# Patient Record
Sex: FEMALE | Race: WHITE | NOT HISPANIC OR LATINO | Employment: OTHER | URBAN - METROPOLITAN AREA
[De-identification: names, ages, dates, MRNs, and addresses within clinical notes are randomized per-mention and may not be internally consistent; named-entity substitution may affect disease eponyms.]

---

## 2017-06-20 ENCOUNTER — ALLSCRIPTS OFFICE VISIT (OUTPATIENT)
Dept: OTHER | Facility: OTHER | Age: 82
End: 2017-06-20

## 2017-06-20 DIAGNOSIS — G20 PARKINSON'S DISEASE (HCC): ICD-10-CM

## 2017-06-20 DIAGNOSIS — G45.0 VERTEBRO-BASILAR ARTERY SYNDROME: ICD-10-CM

## 2017-07-12 ENCOUNTER — HOSPITAL ENCOUNTER (OUTPATIENT)
Dept: MRI IMAGING | Facility: CLINIC | Age: 82
Discharge: HOME | End: 2017-07-12
Payer: MEDICARE

## 2017-07-12 DIAGNOSIS — G45.0 VERTEBRO-BASILAR ARTERY SYNDROME: ICD-10-CM

## 2017-07-12 DIAGNOSIS — G20 PARKINSON'S DISEASE (HCC): ICD-10-CM

## 2017-07-12 PROCEDURE — 70551 MRI BRAIN STEM W/O DYE: CPT

## 2017-07-12 PROCEDURE — 70547 MR ANGIOGRAPHY NECK W/O DYE: CPT

## 2017-07-12 PROCEDURE — 70544 MR ANGIOGRAPHY HEAD W/O DYE: CPT

## 2017-11-10 ENCOUNTER — ALLSCRIPTS OFFICE VISIT (OUTPATIENT)
Dept: OTHER | Facility: OTHER | Age: 82
End: 2017-11-10

## 2017-11-11 NOTE — PROGRESS NOTES
Assessment  1  Parkinson disease, symptomatic (332 0) (G20)   2  BPV (benign positional vertigo), bilateral (386 11) (H81 13)    Plan  Parkinson disease, symptomatic    · Continue with our present treatment plan ; Status:Complete;   Done: 10KWY7066   Ordered;disease, symptomatic; Ordered By:Trevon Peralta;   · Follow-up visit in 6 months Evaluation and Treatment  Follow-up  Status: Complete Done: 50MSL4669   Ordered;Parkinson disease, symptomatic; Ordered By: Bhavna March Performed:  Due: 93RCD5100; Last Updated By: Minda Kearney; 11/10/2017 1:34:58 PM    Discussion/Summary  Discussion Summary:   Patient with a history of Parkinson's disease advise to continue home exercises, continue present medications  For the benign positional vertigo she has advise she may take meclizine on a p r n  basis and does not wish to consider vestibular therapy at this time  She is advised to return back to see me in 6 months  Chief Complaint  Chief Complaint Free Text Note Form: Patient present for follow up appointment for her BPV, vertebrobasilar TIAs, and Parkinson disease  History of Present Illness  HPI: Patient is here for a follow-up visit with a history of benign positional vertigo, Parkinson's disease, and remains on Sinemet and Azilect on a regular basis with no worsening symptoms  She also denies any worsening gait difficulty  She continues to experience vertigo with changes in position and did see adequate relief with the help of meclizine  She has discontinued using meclizine and had a workup done in the form of an MRI and an MRA which were all unremarkable except for age-related changes with chronic ischemic changes on the MRI  She denies any new neurological symptoms  Symptoms  Review of Systems  Neurological ROS:  Constitutional: no fever, no chills, no recent weight gain, no recent weight loss, no complaints of feeling tired, no changes in appetite    HEENT:  no sinus problems, not feeling congested, no blurred vision, no dryness of the eyes, no eye pain, no hearing loss, no tinnitus, no mouth sores, no sore throat, no hoarseness, no dysphagia, no masses, no bleeding  Cardiovascular:  no chest pain or pressure, no palpitations present, the heart rate was not rapid or irregular, no swelling in the arms or legs, no poor circulation  Respiratory:  no unusual or persistant cough, no shortness of breath with or without exertion  Gastrointestinal:  no nausea, no vomiting, no diarrhea, no abdominal pain, no changes in bowel habits, no melena, no loss of bowel control  Genitourinary:  no incontinence, no feelings of urinary urgency, no increase in frequency, no urinary hesitancy, no dysuria, no hematuria  Musculoskeletal: immobility or loss of function-- and-- head/neck/back pain  Integumentary  no masses, no rash, no skin lesions, no livedo reticularis  Psychiatric:  no anxiety, no depression, no mood swings, no psychiatric hospitalizations, no sleep problems  Endocrine  no unusual weight loss or gain, no excessive urination, no excessive thirst, no hair loss or gain, no hot or cold intolerance, no menstrual period change or irregularity, no loss of sexual ability or drive, no erection difficulty, no nipple discharge  Hematologic/Lymphatic:  no unusual bleeding, no tendency for easy bruising, no clotting skin or lumps  Neurological General:  no headache, no nausea or vomiting, no lightheadedness, no convulsions, no blackouts, no syncope, no trauma, no photopsia, no increased sleepiness, no trouble falling asleep, no snoring, no awakening at night  Neurological Mental Status:  no confusion, no mood swings, no alteration or loss of consciousness, no difficulty expressing/understanding speech, no memory problems    Neurological Cranial Nerves:  no blurry or double vision, no loss of vision, no face drooping, no facial numbness or weakness, no taste or smell loss/changes, no hearing loss or ringing, no vertigo or dizziness, no dysphagia, no slurred speech  Neurological Motor findings include:  no tremor, no twitching, no cramping(pre/post exercise), no atrophy  Neurological Coordination:  no unsteadiness, no vertigo or dizziness, no clumsiness, no problems reaching for objects  Neurological Sensory:  no numbness, no pain, no tingling, does not fall when eyes closed or taking a shower  Neurological Gait:  no difficulty walking, not falling to one side, no sensation of being pushed, has not had falls  ROS Reviewed:   ROS reviewed  Active Problems  1  Arthritis (716 90) (M19 90)   2  BPV (benign positional vertigo), bilateral (386 11) (H81 13)   3  High cholesterol (272 0) (E78 00)   4  Hypertension (401 9) (I10)   5  Lumbar spinal stenosis (724 02) (M48 061)   6  Parkinson disease, symptomatic (332 0) (BrightTALKer)   7  Strain of thoracic region (847 1) (S29 019A)   8  Tremor (781 0) (R25 1)    Past Medical History  1  History of stomach ulcers (V12 79) (Z87 19)    Surgical History  1  History of Appendectomy   2  History of Cataract Surgery   3  History of Tonsillectomy   4  History of Total Knee Replacement Left   5  History of Total Knee Replacement Right    Family History  Mother    1  Family history of anemia (V18 2) (Z83 2)   2  Family history of chronic obstructive pulmonary disease (V17 6) (Z82 5)  Father    3  Family history of peptic ulcer (V18 59) (Z83 79)  Brother    4  Family history of cardiac pacemaker (V17 49) (Z84 89)   5  Family history of Ulcer    Social History     · Former smoker (T78 06) (Q87 236)   · Retired   · Single  Social History Reviewed: The social history was reviewed and updated today  Current Meds   1  Allopurinol 100 MG Oral Tablet; Take 1 tablet daily; Therapy: (Recorded:10Nov2017) to Recorded   2  AmLODIPine Besylate 5 MG Oral Tablet; TAKE 1 TABLET DAILY; Therapy: (Recorded:33Dou9096) to Recorded   3  Aspirin 81 MG TABS; TAKE 1 TABLET DAILY;  Therapy: (Recorded:80Uqb8697) to Recorded   4  Azilect 1 MG Oral Tablet; TAKE 1 TABLET DAILY; Therapy: 52DAR3727 to (Evaluate:05Wmu0458)  Requested for: 58Wio0587; Last Rx:91Kgw8602 Ordered   5  Calcium + D3 600-200 MG-UNIT Oral Tablet; take 2 tablet twice daily; Therapy: (Recorded:25Vcy1698) to Recorded   6  Carbidopa-Levodopa  MG Oral Tablet; TAKE 1 TABLET 3 TIMES DAILY; Therapy: 27MPC9955 to (Evaluate:15Awv0380)  Requested for: 44Mzd9912; Last Rx:81Yva9140 Ordered   7  Centrum Silver TABS; TAKE 1 TABLET DAILY; Therapy: (Recorded:92Ora0579) to Recorded   8  Fish Oil 1000 MG Oral Capsule; Take 1 capsule twice daily; Therapy: (Recorded:43Fmd4263) to Recorded   9  Meclizine HCl - 25 MG Oral Tablet; TAKE 1 TABLET Twice daily PRN; Therapy: 51SCX1815 to (Evaluate:65Mlm4310)  Requested for: 20Jun2017; Last Rx:20Jun2017 Ordered   10  Simvastatin 20 MG Oral Tablet; TAKE 1 TABLET DAILY AT BEDTIME; Therapy: (Recorded:91Cqf3313) to Recorded   11  Vitamin D CAPS; Take 1 capsule twice daily; Therapy: (Recorded:07Aav9132) to Recorded  Medication List Reviewed: The medication list was reviewed and updated today  Allergies  1  Cephalosporins   2  Penicillin V Potassium TABS    3  No Known Environmental Allergies   4  No Known Food Allergies    Vitals  Signs   Recorded: 83VZR9234 12:47PM   Heart Rate: 50  Systolic: 501  Diastolic: 74  Height: 5 ft 1 in  Weight: 166 lb   BMI Calculated: 31 37  BSA Calculated: 1 75    Physical Exam   Constitutional  General appearance: No acute distress, well appearing and well nourished  Musculoskeletal  Gait and station: Normal gait, stance and balance  -- Patient required minimal assistance to stand and her gait is normal based with no evidence of any stooping or shuffling  Muscle strength: Normal strength throughout  Muscle tone: No atrophy, abnormal movements, flaccidity, cogwheeling or spasticity  -- No significant cogwheeling rigidity was noted    Involuntary movements: Abnormal involuntary movements were observed  -- Occasional intermittent right upper extremity tremor was noted  Neurologic  Orientation to person, place, and time: Normal    Language: Names objects, able to repeat phrases and speaks spontaneously  3rd, 4th, and 6th cranial nerves: Normal    5th cranial nerve: Normal    7th cranial nerve: Normal    Sensation: Normal    Reflexes: Normal    Coordination: Normal        Signatures   Electronically signed by :  Oswaldo Garrett MD; Nov 10 2017  2:51PM EST                       (Author)

## 2018-01-11 NOTE — PROGRESS NOTES
Assessment    1  Parkinson disease, symptomatic (332 0) (G20)   2  Lumbar spinal stenosis (724 02) (M48 06)    Plan  Lumbar spinal stenosis    · Follow-up visit in 2 months Evaluation and Treatment  Follow-up  Status: Complete   Done: 03MOV5184   Ordered; For: Lumbar spinal stenosis; Ordered By: Isabel Sandhoff Performed:  Due: 49QUR3503; Last Updated By: Oxonica; 1/27/2016 4:23:24 PM  Lumbar spinal stenosis, Parkinson disease, symptomatic    · Physical Therapy Referral Other Physician Referral  Consult for gait dysfunction, patient  with a history of recently detected Parkinson's disease and lumbar spinal stenosis  Status: Hold For - Scheduling  Requested for: 88HHA2368   Ordered; For: Lumbar spinal stenosis, Parkinson disease, symptomatic; Ordered By: Isabel Sandhoff Performed:  Due: 74OVV0093  are Referring to a non- Preferred Provider : Established Patient  Care Summary provided  : Yes  Parkinson disease, symptomatic    · Azilect 1 MG Oral Tablet; TAKE 1 TABLET DAILY   Rx By: Isabel Sandhoff; Dispense: 30 Days ; #:30 Tablet; Refill: 3; For: Parkinson disease, symptomatic; ABHINAV = N; Verified Transmission to [address] [id number]; Last Updated By: SystemInstallFree; 1/27/2016 4:19:05 PM    Discussion/Summary  Discussion Summary:   Patient with a history of Parkinson's disease, gait dysfunction, lumbar spinal stenosis is now advised to continue Sinemet at the present dosage, she is advised to increase the dose of Azilect 1 mg daily, physical therapy was also prescribed and the patient is advised to return back to see me in 2 months  She denies any side effects from the medications  Medication Side Effects Reviewed: Possible side effects of new medications were reviewed with the patient/guardian today  Patient Guardian understands agrees: The treatment plan was reviewed with the patient/guardian   The patient/guardian understands and agrees with the treatment plan      Chief Complaint  Chief Complaint Free Text Note Form: Rashard Prather is a right handed 80year old lady who returns in f/u with hx of Parkinson's and lumbar spinal stenosis  History of Present Illness  HPI: Patient is here for a follow-up visit with a history of Parkinson's disease and lumbar spinal stenosis  Since her last visit she had an MRI of the brain which showed diffuse white matter changes, no evidence of any dilated ventricles  Patient also was started on Sinemet 25/100, one tablet 3 times a day  She also has been taking Azilect 0 5 mg daily and has noted significant improvement  She feels her tremor has improved, her gait is improved and she has not been able to start physical therapy yet  Patient denies any other neurological symptoms except for back pain especially if she stands for any prolonged period of time  Review of Systems  Neurological ROS:   Constitutional: no fever, no chills, no recent weight gain, no recent weight loss, no complaints of feeling tired, no changes in appetite  HEENT:  no sinus problems, not feeling congested, no blurred vision, no dryness of the eyes, no eye pain, no hearing loss, no tinnitus, no mouth sores, no sore throat, no hoarseness, no dysphagia, no masses, no bleeding  Cardiovascular:  no chest pain or pressure, no palpitations present, the heart rate was not rapid or irregular, no swelling in the arms or legs, no poor circulation  Respiratory:  no unusual or persistant cough, no shortness of breath with or without exertion  Gastrointestinal:  no nausea, no vomiting, no diarrhea, no abdominal pain, no changes in bowel habits, no melena, no loss of bowel control  Genitourinary:  no incontinence, no feelings of urinary urgency, no increase in frequency, no urinary hesitancy, no dysuria, no hematuria  Musculoskeletal: head/neck/back pain  Integumentary  no masses, no rash, no skin lesions, no livedo reticularis     Psychiatric:  no anxiety, no depression, no mood swings, no psychiatric hospitalizations, no sleep problems  Endocrine  no unusual weight loss or gain, no excessive urination, no excessive thirst, no hair loss or gain, no hot or cold intolerance, no menstrual period change or irregularity, no loss of sexual ability or drive, no erection difficulty, no nipple discharge  Hematologic/Lymphatic:  no unusual bleeding, no tendency for easy bruising, no clotting skin or lumps  Neurological General:  no headache, no nausea or vomiting, no lightheadedness, no convulsions, no blackouts, no syncope, no trauma, no photopsia, no increased sleepiness, no trouble falling asleep, no snoring, no awakening at night  Neurological Mental Status: memory problems  Neurological Cranial Nerves:  no blurry or double vision, no loss of vision, no face drooping, no facial numbness or weakness, no taste or smell loss/changes, no hearing loss or ringing, no vertigo or dizziness, no dysphagia, no slurred speech  Neurological Motor findings include: tremor and tremors have gotten better since last visit  Neurological Coordination: balance difficulties and issues walking up stairs  Neurological Sensory:  no numbness, no pain, no tingling, does not fall when eyes closed or taking a shower  Neurological Gait: difficulty walking, but has not had falls  ROS Reviewed:   ROS reviewed  Active Problems    1  Arthritis (716 90) (M19 90)   2  High cholesterol (272 0) (E78 0)   3  Hypertension (401 9) (I10)   4  Lumbar spinal stenosis (724 02) (M48 06)   5  Parkinson disease, symptomatic (332 0) (G20)   6  Tremor (781 0) (R25 1)    Past Medical History    1  History of stomach ulcers (V12 79) (Z87 19)    Surgical History    1  History of Appendectomy   2  History of Cataract Surgery   3  History of Knee Surgery    Family History    1  Family history of anemia (V18 2) (Z83 2)   2  Family history of chronic obstructive pulmonary disease (V17 6) (Z82 5)    3   Family history of peptic ulcer (V18 59) (Z53 56)    4  Family history of cardiac pacemaker (V17 49) (Z84 63)   5  Family history of Ulcer    Social History    · Retired   · Single  Social History Reviewed: The social history was reviewed and updated today  Current Meds   1  AmLODIPine Besylate 5 MG Oral Tablet; TAKE 1 TABLET DAILY; Therapy: (Recorded:31Uhy7419) to Recorded   2  Aspirin 81 MG Oral Tablet; TAKE 1 TABLET DAILY; Therapy: (Recorded:96Kvj7326) to Recorded   3  Azilect 0 5 MG Oral Tablet; TAKE 1 TABLET DAILY  Requested for: 37KFE2787; Last   Rx:54Tpz4615 Ordered   4  Calcium + D3 600-200 MG-UNIT Oral Tablet; take 2 tablet twice daily; Therapy: (Recorded:99Dsu9207) to Recorded   5  Carbidopa-Levodopa  MG Oral Tablet; TAKE 1 TABLET 3 TIMES DAILY; Therapy: 61NUO6269 to (Evaluate:78Kiy3611)  Requested for: 13Bqu3405; Last   Rx:17Wvz5004 Ordered   6  Centrum Silver TABS; TAKE 1 TABLET DAILY; Therapy: (Recorded:52Til2940) to Recorded   7  Fish Oil 1000 MG Oral Capsule; Take 1 capsule twice daily; Therapy: (Recorded:55Ttj6741) to Recorded   8  Lisinopril 20 MG Oral Tablet; TAKE 1 TABLET Bedtime; Therapy: (Recorded:23Tca9669) to Recorded   9  Lisinopril-Hydrochlorothiazide 20-25 MG Oral Tablet; TAKE 1 TABLET DAILY; Therapy: (Recorded:17Uzu9106) to Recorded   10  PriLOSEC OTC 20 MG Oral Tablet Delayed Release; TAKE 1 TABLET DAILY; Therapy: (Recorded:11Pcl1758) to Recorded   11  Simvastatin 20 MG Oral Tablet; TAKE 1 TABLET DAILY AT BEDTIME; Therapy: (Recorded:01Xik1675) to Recorded   12  Vitamin D CAPS; Take 1 capsule twice daily; Therapy: (Recorded:27Gyv5677) to Recorded  Medication List Reviewed: The medication list was reviewed and updated today  Allergies    1  Cephalosporins   2   Penicillin V Potassium TABS    Vitals  Signs [Data Includes: Current Encounter]   Recorded: 31ZLQ2029 03:45PM   Heart Rate: 58  Systolic: 476, LUE, Sitting  Diastolic: 68, LUE, Sitting  Height: 5 ft   Weight: 170 lb   BMI Calculated: 33 2  BSA Calculated: 1 74    Physical Exam   Patient has evidence of bradykinesia, resting tremor is noted intermittently in the right hand, no evidence of any cogwheeling rigidity was noted, she would turn in 3 steps and had  mild difficulty standing from the sitting position  Signatures   Electronically signed by :  Rajani Drummond MD; Jan 27 2016  4:24PM EST                       (Author)

## 2018-01-12 VITALS
HEART RATE: 50 BPM | SYSTOLIC BLOOD PRESSURE: 120 MMHG | HEIGHT: 61 IN | WEIGHT: 166 LBS | BODY MASS INDEX: 31.34 KG/M2 | DIASTOLIC BLOOD PRESSURE: 74 MMHG

## 2018-01-14 VITALS
HEART RATE: 76 BPM | BODY MASS INDEX: 28.34 KG/M2 | DIASTOLIC BLOOD PRESSURE: 54 MMHG | WEIGHT: 154 LBS | HEIGHT: 62 IN | SYSTOLIC BLOOD PRESSURE: 76 MMHG

## 2018-02-26 DIAGNOSIS — G20 PARKINSON'S DISEASE (HCC): Primary | ICD-10-CM

## 2018-02-26 RX ORDER — PRAMIPEXOLE DIHYDROCHLORIDE 0.12 MG/1
TABLET ORAL
Qty: 90 TABLET | Refills: 0 | Status: SHIPPED | OUTPATIENT
Start: 2018-02-26 | End: 2018-03-26 | Stop reason: SDUPTHER

## 2018-03-26 DIAGNOSIS — G20 PARKINSON'S DISEASE (HCC): ICD-10-CM

## 2018-03-26 RX ORDER — PRAMIPEXOLE DIHYDROCHLORIDE 0.12 MG/1
TABLET ORAL
Qty: 90 TABLET | Refills: 0 | Status: SHIPPED | OUTPATIENT
Start: 2018-03-26 | End: 2018-05-09 | Stop reason: ALTCHOICE

## 2018-04-13 DIAGNOSIS — G20 PARKINSON'S DISEASE (HCC): Primary | ICD-10-CM

## 2018-05-04 RX ORDER — LISINOPRIL 20 MG/1
20 TABLET ORAL DAILY
COMMUNITY
Start: 2018-03-19

## 2018-05-04 RX ORDER — AMLODIPINE BESYLATE 5 MG/1
1 TABLET ORAL DAILY
COMMUNITY
End: 2018-08-12 | Stop reason: ALTCHOICE

## 2018-05-04 RX ORDER — KETOCONAZOLE 20 MG/ML
SHAMPOO TOPICAL
COMMUNITY
Start: 2018-03-19 | End: 2018-08-12 | Stop reason: ALTCHOICE

## 2018-05-04 RX ORDER — ALLOPURINOL 100 MG/1
1 TABLET ORAL DAILY
COMMUNITY

## 2018-05-04 RX ORDER — RASAGILINE 1 MG/1
1 TABLET ORAL DAILY
COMMUNITY
Start: 2016-01-27 | End: 2018-08-12 | Stop reason: ALTCHOICE

## 2018-05-04 RX ORDER — CHLORAL HYDRATE 500 MG
1 CAPSULE ORAL 2 TIMES DAILY
COMMUNITY
End: 2019-03-12 | Stop reason: SDDI

## 2018-05-04 RX ORDER — SIMVASTATIN 20 MG
1 TABLET ORAL
COMMUNITY
End: 2018-08-12 | Stop reason: ALTCHOICE

## 2018-05-04 RX ORDER — MULTIVIT-MIN/IRON/FOLIC ACID/K 18-600-40
1 CAPSULE ORAL 2 TIMES DAILY
COMMUNITY

## 2018-05-04 RX ORDER — MULTIVIT WITH MINERALS/LUTEIN
1 TABLET ORAL DAILY
COMMUNITY

## 2018-05-04 RX ORDER — MECLIZINE HYDROCHLORIDE 25 MG/1
1 TABLET ORAL 2 TIMES DAILY PRN
COMMUNITY
Start: 2017-06-20 | End: 2019-03-12 | Stop reason: ALTCHOICE

## 2018-05-09 ENCOUNTER — OFFICE VISIT (OUTPATIENT)
Dept: NEUROLOGY | Facility: CLINIC | Age: 83
End: 2018-05-09
Payer: MEDICARE

## 2018-05-09 VITALS
DIASTOLIC BLOOD PRESSURE: 70 MMHG | SYSTOLIC BLOOD PRESSURE: 104 MMHG | BODY MASS INDEX: 27.05 KG/M2 | HEIGHT: 62 IN | WEIGHT: 147 LBS | HEART RATE: 60 BPM

## 2018-05-09 DIAGNOSIS — G20 PD (PARKINSON'S DISEASE) (HCC): Primary | ICD-10-CM

## 2018-05-09 PROCEDURE — 99214 OFFICE O/P EST MOD 30 MIN: CPT | Performed by: PSYCHIATRY & NEUROLOGY

## 2018-05-09 NOTE — PROGRESS NOTES
Progress Note - Neurology   Juany Antony 80 y o  female MRN: [id number]  Unit/Bed#:  Encounter: [id number]      Subjective:   Patient is here for a follow-up visit with a history of Parkinson's disease, benign positional vertigo and since her last visit overall she has been doing well, with good days and bad days and at times also experiences mid back pain especially when she does extra strenuous activities  She remains on Sinemet, Mirapex, and Azilect  She also describes swelling in both her feet  She continues to have an intermittent tremor affecting mostly the left side  Patient denies any other neurological symptoms  ROS:   Review of Systems   Constitutional: Negative  HENT: Negative  Eyes: Negative  Respiratory: Negative  Cardiovascular: Positive for leg swelling  Gastrointestinal: Negative  Endocrine: Negative  Genitourinary: Negative  Musculoskeletal: Negative  Skin: Negative  Allergic/Immunologic: Negative  Neurological: Positive for dizziness and tremors  Hematological: Negative  Psychiatric/Behavioral: Negative  Vitals: There were no vitals filed for this visit  ,Body mass index is 27 1 kg/m²      MEDS:      Current Outpatient Prescriptions:     meclizine (ANTIVERT) 25 mg tablet, Take 1 tablet by mouth 2 (two) times a day as needed, Disp: , Rfl:     rasagiline (AZILECT) 1 MG, Take 1 tablet by mouth daily, Disp: , Rfl:     allopurinol (ZYLOPRIM) 100 mg tablet, Take 1 tablet by mouth daily, Disp: , Rfl:     amLODIPine (NORVASC) 5 mg tablet, Take 1 tablet by mouth daily, Disp: , Rfl:     aspirin 81 MG tablet, Take 1 tablet by mouth daily, Disp: , Rfl:     Calcium Carb-Cholecalciferol (CALCIUM + D3) 600-200 MG-UNIT TABS, Take 2 tablets by mouth 2 (two) times a day, Disp: , Rfl:     carbidopa-levodopa (SINEMET)  mg per tablet, Take 1 tablet by mouth 3 (three) times a day, Disp: 270 tablet, Rfl: 3    Cholecalciferol (VITAMIN D) 2000 units CAPS, Take 1 capsule by mouth 2 (two) times a day, Disp: , Rfl:     ketoconazole (NIZORAL) 2 % shampoo, , Disp: , Rfl:     lisinopril (ZESTRIL) 20 mg tablet, , Disp: , Rfl:     Multiple Vitamins-Minerals (CENTRUM SILVER) tablet, Take 1 tablet by mouth daily, Disp: , Rfl:     Omega-3 Fatty Acids (FISH OIL) 1,000 mg, Take 1 capsule by mouth 2 (two) times a day, Disp: , Rfl:     pramipexole (MIRAPEX) 0 125 mg tablet, TAKE ONE TABLET BY MOUTH THREE TIMES DAILY , Disp: 90 tablet, Rfl: 0    simvastatin (ZOCOR) 20 mg tablet, Take 1 tablet by mouth, Disp: , Rfl:   :    Physical Exam:  General appearance: alert, appears stated age and cooperative  Head: Normocephalic, without obvious abnormality, atraumatic    Neurologic:  Her examination shows evidence of mild cogwheeling rigidity affecting the right upper extremity, resting tremor affecting the left upper and lower extremity, poor arm swing, she could stand without assistance, no evidence of any postural instability was noted and she has evidence of swelling in both her feet with skin color changes bilaterally  Lab Results: I have personally reviewed pertinent reports  Imaging Studies: I have personally reviewed pertinent reports  Assessment:  1  Parkinson's disease  2  Benign positional vertigo  Plan:  Patient is advised to continue Sinemet and Azilect at the present dosage, due to the severe swelling in the left lower extremity she is advised to discontinue Mirapex at this time and will try her on a dopamine agonist in the transdermal preparation form  Neupro patches were prescribed, patient was given titration samples for 1 milligram and also given 4 milligram samples  If she sees improvement and the swelling subsided she is advised to continue the same otherwise advised to call me  She will return back to see me in 2 months    Patient remains on meclizine only on a p r n  basis and has been doing well without any significant episodic vertigo  5/9/2018,3:46 PM    Dictation voice to text software has been used in the creation of this document  Please consider this in light of any contextual or grammatical errors

## 2018-06-12 ENCOUNTER — TELEPHONE (OUTPATIENT)
Dept: NEUROLOGY | Facility: CLINIC | Age: 83
End: 2018-06-12

## 2018-06-12 DIAGNOSIS — G20 PD (PARKINSON'S DISEASE) (HCC): ICD-10-CM

## 2018-06-12 NOTE — TELEPHONE ENCOUNTER
pt states that she doesn't really see any change since using neupro patch  she states that the swelling has gotten better  currently taking 4mg patch   script that was ordered on 5/9 was printed  i called pharmacy and no script on file  do you want pt to continue on neupro?  if so plesase send new script to [facility]      [phone]

## 2018-07-26 ENCOUNTER — TELEPHONE (OUTPATIENT)
Dept: NEUROLOGY | Facility: CLINIC | Age: 83
End: 2018-07-26

## 2018-07-26 ENCOUNTER — OFFICE VISIT (OUTPATIENT)
Dept: NEUROLOGY | Facility: CLINIC | Age: 83
End: 2018-07-26
Payer: MEDICARE

## 2018-07-26 VITALS
SYSTOLIC BLOOD PRESSURE: 118 MMHG | DIASTOLIC BLOOD PRESSURE: 50 MMHG | HEART RATE: 52 BPM | HEIGHT: 61 IN | WEIGHT: 150 LBS | BODY MASS INDEX: 28.32 KG/M2

## 2018-07-26 DIAGNOSIS — G20 PD (PARKINSON'S DISEASE) (HCC): Primary | ICD-10-CM

## 2018-07-26 DIAGNOSIS — S29.019D STRAIN OF THORACIC REGION, SUBSEQUENT ENCOUNTER: ICD-10-CM

## 2018-07-26 PROBLEM — S29.019A STRAIN OF THORACIC REGION: Status: ACTIVE | Noted: 2018-07-26

## 2018-07-26 PROCEDURE — 99214 OFFICE O/P EST MOD 30 MIN: CPT | Performed by: PSYCHIATRY & NEUROLOGY

## 2018-07-26 RX ORDER — LIDOCAINE 50 MG/G
1 PATCH TOPICAL DAILY
Qty: 30 PATCH | Refills: 6 | Status: SHIPPED | OUTPATIENT
Start: 2018-07-26 | End: 2018-08-12 | Stop reason: DRUGHIGH

## 2018-07-26 RX ORDER — AMANTADINE HYDROCHLORIDE 100 MG/1
100 CAPSULE, GELATIN COATED ORAL 2 TIMES DAILY
Qty: 60 CAPSULE | Refills: 3 | Status: SHIPPED | OUTPATIENT
Start: 2018-07-26 | End: 2018-11-27 | Stop reason: ALTCHOICE

## 2018-07-26 NOTE — PROGRESS NOTES
Progress Note - Neurology   Kylee Ruiz 80 y o  female MRN: [id number]  Unit/Bed#:  Encounter: [id number]      Subjective:   Patient is here for a follow-up visit with a history of Parkinson's disease, chronic mid back pain, and since her last visit she has discontinue Neupro patches as well as Mirapex her leg swelling has significantly resolved  She denies any episodes of vertigo  She continues to experience tremors on a regular basis but are not incapacitating and has been on Sinemet 25/100, 1 tablets 3 times a day and Azilect 1 mg daily  Patient also has been having worsening mid back pain in the recent past especially when her activity level is increase, and feels frustrated about not being able to be as mobile as she was  She denies any new neurological symptoms  ROS:   Review of Systems   Constitutional: Negative  HENT: Negative  Eyes: Positive for photophobia  Respiratory: Negative  Cardiovascular: Positive for leg swelling  Gastrointestinal: Negative  Endocrine: Negative  Genitourinary: Negative  Musculoskeletal: Negative  Skin: Negative  Allergic/Immunologic: Negative  Neurological: Positive for tremors and headaches  Hematological: Negative  Psychiatric/Behavioral: Negative  Vitals:   Vitals:    07/26/18 1316   BP: 118/50   Pulse: (!) 52   ,Body mass index is 28 34 kg/m²      MEDS:      Current Outpatient Prescriptions:     allopurinol (ZYLOPRIM) 100 mg tablet, Take 1 tablet by mouth daily, Disp: , Rfl:     amLODIPine (NORVASC) 5 mg tablet, Take 1 tablet by mouth daily, Disp: , Rfl:     aspirin 81 MG tablet, Take 1 tablet by mouth daily, Disp: , Rfl:     Calcium Carb-Cholecalciferol (CALCIUM + D3) 600-200 MG-UNIT TABS, Take 2 tablets by mouth 2 (two) times a day, Disp: , Rfl:     Cholecalciferol (VITAMIN D) 2000 units CAPS, Take 1 capsule by mouth 2 (two) times a day, Disp: , Rfl:     ketoconazole (NIZORAL) 2 % shampoo, , Disp: , Rfl:     lisinopril (ZESTRIL) 20 mg tablet, Take 20 mg by mouth daily  , Disp: , Rfl:     meclizine (ANTIVERT) 25 mg tablet, Take 1 tablet by mouth 2 (two) times a day as needed, Disp: , Rfl:     Multiple Vitamins-Minerals (CENTRUM SILVER) tablet, Take 1 tablet by mouth daily, Disp: , Rfl:     Omega-3 Fatty Acids (FISH OIL) 1,000 mg, Take 1 capsule by mouth 2 (two) times a day, Disp: , Rfl:     rasagiline (AZILECT) 1 MG, Take 1 tablet by mouth daily, Disp: , Rfl:     simvastatin (ZOCOR) 20 mg tablet, Take 1 tablet by mouth, Disp: , Rfl:   :    Physical Exam:  General appearance: alert, appears stated age and cooperative  Head: Normocephalic, without obvious abnormality, atraumatic    Neurologic:  On examination there is no evidence of any cranial nerve deficit, Myerson's sign is positive with mild bradykinesia of the facial muscles, she has evidence of a resting tremor affecting both lower extremities as well as the right upper extremity, no evidence of any significant cogwheeling rigidity was noted, no other focal motor or sensory deficits were noted  Deep tendon reflexes are 1+ bilaterally and there is no dysmetria noted  Patient has significant tenderness in the mid thoracic segments  Lab Results: I have personally reviewed pertinent reports  Imaging Studies: I have personally reviewed pertinent reports  Assessment:  1  Parkinson's disease, tremor predominant  2  Gait dysfunction  3  Thoracic strain  Plan:  Patient was prescribed lidocaine patches to be applied at bedtime and removed in the morning due to the severe degree of pain preventing her from sleeping at night, will advised her to continue Sinemet 25/100 1 tablet 3 times a day as well as Azilect 1 mg daily and was also prescribed amantadine 100 mg twice a day to see if she has further relief from the tremors    Side effects of the medication were again discussed, home exercise program is encouraged, patient is advised to return back to see me in 3 months     7/26/2018,1:22 PM    Dictation voice to text software has been used in the creation of this document  Please consider this in light of any contextual or grammatical errors

## 2018-07-26 NOTE — TELEPHONE ENCOUNTER
Ruth Cooper at [facility] states the licocaine patch requires a PA, however, before doing the PA she wants to see if you would like to have the patient  try the OTC aspercreme 4% patch instead, because the PA is almost always denied for lidocaine patch      [phone]

## 2018-08-12 ENCOUNTER — HOSPITAL ENCOUNTER (INPATIENT)
Facility: HOSPITAL | Age: 83
LOS: 1 days | Discharge: HOME | DRG: 305 | End: 2018-08-14
Attending: EMERGENCY MEDICINE | Admitting: INTERNAL MEDICINE
Payer: MEDICARE

## 2018-08-12 ENCOUNTER — APPOINTMENT (EMERGENCY)
Dept: CT IMAGING | Facility: HOSPITAL | Age: 83
DRG: 305 | End: 2018-08-12
Payer: MEDICARE

## 2018-08-12 ENCOUNTER — APPOINTMENT (EMERGENCY)
Dept: RADIOLOGY | Facility: HOSPITAL | Age: 83
DRG: 305 | End: 2018-08-12
Payer: MEDICARE

## 2018-08-12 DIAGNOSIS — I16.0 HYPERTENSIVE URGENCY: Primary | ICD-10-CM

## 2018-08-12 LAB
ALBUMIN SERPL BCP-MCNC: 3.9 G/DL (ref 3.5–5)
ALP SERPL-CCNC: 97 U/L (ref 46–116)
ALT SERPL W P-5'-P-CCNC: 16 U/L (ref 12–78)
ANION GAP SERPL CALCULATED.3IONS-SCNC: 10 MMOL/L (ref 4–13)
AST SERPL W P-5'-P-CCNC: 15 U/L (ref 5–45)
ATRIAL RATE: 85 BPM
BACTERIA UR QL AUTO: ABNORMAL /HPF
BASOPHILS # BLD AUTO: 0.04 THOUSANDS/ΜL (ref 0–0.1)
BASOPHILS NFR BLD AUTO: 1 % (ref 0–1)
BILIRUB SERPL-MCNC: 0.5 MG/DL (ref 0.2–1)
BILIRUB UR QL STRIP: NEGATIVE
BUN SERPL-MCNC: 24 MG/DL (ref 5–25)
CALCIUM SERPL-MCNC: 9.1 MG/DL (ref 8.3–10.1)
CHLORIDE SERPL-SCNC: 106 MMOL/L (ref 100–108)
CLARITY UR: CLEAR
CO2 SERPL-SCNC: 25 MMOL/L (ref 21–32)
COLOR UR: ABNORMAL
CREAT SERPL-MCNC: 1.13 MG/DL (ref 0.6–1.3)
EOSINOPHIL # BLD AUTO: 0.15 THOUSAND/ΜL (ref 0–0.61)
EOSINOPHIL NFR BLD AUTO: 2 % (ref 0–6)
ERYTHROCYTE [DISTWIDTH] IN BLOOD BY AUTOMATED COUNT: 13.7 % (ref 11.6–15.1)
GFR SERPL CREATININE-BSD FRML MDRD: 43 ML/MIN/1.73SQ M
GLUCOSE SERPL-MCNC: 92 MG/DL (ref 65–140)
GLUCOSE UR STRIP-MCNC: NEGATIVE MG/DL
HCT VFR BLD AUTO: 36.3 % (ref 34.8–46.1)
HGB BLD-MCNC: 12.1 G/DL (ref 11.5–15.4)
HGB UR QL STRIP.AUTO: ABNORMAL
IMM GRANULOCYTES # BLD AUTO: 0.02 THOUSAND/UL (ref 0–0.2)
IMM GRANULOCYTES NFR BLD AUTO: 0 % (ref 0–2)
KETONES UR STRIP-MCNC: NEGATIVE MG/DL
LEUKOCYTE ESTERASE UR QL STRIP: NEGATIVE
LYMPHOCYTES # BLD AUTO: 1.04 THOUSANDS/ΜL (ref 0.6–4.47)
LYMPHOCYTES NFR BLD AUTO: 15 % (ref 14–44)
MCH RBC QN AUTO: 31 PG (ref 26.8–34.3)
MCHC RBC AUTO-ENTMCNC: 33.3 G/DL (ref 31.4–37.4)
MCV RBC AUTO: 93 FL (ref 82–98)
MONOCYTES # BLD AUTO: 0.65 THOUSAND/ΜL (ref 0.17–1.22)
MONOCYTES NFR BLD AUTO: 9 % (ref 4–12)
NEUTROPHILS # BLD AUTO: 5 THOUSANDS/ΜL (ref 1.85–7.62)
NEUTS SEG NFR BLD AUTO: 73 % (ref 43–75)
NITRITE UR QL STRIP: POSITIVE
NON-SQ EPI CELLS URNS QL MICRO: ABNORMAL /HPF
NRBC BLD AUTO-RTO: 0 /100 WBCS
P AXIS: 65 DEGREES
PH UR STRIP.AUTO: 6 [PH] (ref 4.5–8)
PLATELET # BLD AUTO: 206 THOUSANDS/UL (ref 149–390)
PMV BLD AUTO: 9.7 FL (ref 8.9–12.7)
POTASSIUM SERPL-SCNC: 3.6 MMOL/L (ref 3.5–5.3)
PR INTERVAL: 202 MS
PROT SERPL-MCNC: 7 G/DL (ref 6.4–8.2)
PROT UR STRIP-MCNC: NEGATIVE MG/DL
QRS AXIS: -48 DEGREES
QRSD INTERVAL: 134 MS
QT INTERVAL: 426 MS
QTC INTERVAL: 469 MS
RBC # BLD AUTO: 3.9 MILLION/UL (ref 3.81–5.12)
RBC #/AREA URNS AUTO: ABNORMAL /HPF
SODIUM SERPL-SCNC: 141 MMOL/L (ref 136–145)
SP GR UR STRIP.AUTO: 1.01 (ref 1–1.03)
T WAVE AXIS: 15 DEGREES
TROPONIN I SERPL-MCNC: 0.02 NG/ML
TROPONIN I SERPL-MCNC: <0.02 NG/ML
UROBILINOGEN UR QL STRIP.AUTO: 0.2 E.U./DL
VENTRICULAR RATE: 73 BPM
WBC # BLD AUTO: 6.9 THOUSAND/UL (ref 4.31–10.16)
WBC #/AREA URNS AUTO: ABNORMAL /HPF

## 2018-08-12 PROCEDURE — 96374 THER/PROPH/DIAG INJ IV PUSH: CPT

## 2018-08-12 PROCEDURE — 81001 URINALYSIS AUTO W/SCOPE: CPT | Performed by: EMERGENCY MEDICINE

## 2018-08-12 PROCEDURE — 73030 X-RAY EXAM OF SHOULDER: CPT

## 2018-08-12 PROCEDURE — 36415 COLL VENOUS BLD VENIPUNCTURE: CPT

## 2018-08-12 PROCEDURE — 84484 ASSAY OF TROPONIN QUANT: CPT | Performed by: EMERGENCY MEDICINE

## 2018-08-12 PROCEDURE — 71046 X-RAY EXAM CHEST 2 VIEWS: CPT

## 2018-08-12 PROCEDURE — 99220 PR INITIAL OBSERVATION CARE/DAY 70 MINUTES: CPT | Performed by: INTERNAL MEDICINE

## 2018-08-12 PROCEDURE — 99285 EMERGENCY DEPT VISIT HI MDM: CPT

## 2018-08-12 PROCEDURE — 85025 COMPLETE CBC W/AUTO DIFF WBC: CPT | Performed by: EMERGENCY MEDICINE

## 2018-08-12 PROCEDURE — 93005 ELECTROCARDIOGRAM TRACING: CPT

## 2018-08-12 PROCEDURE — 93010 ELECTROCARDIOGRAM REPORT: CPT | Performed by: INTERNAL MEDICINE

## 2018-08-12 PROCEDURE — 70496 CT ANGIOGRAPHY HEAD: CPT

## 2018-08-12 PROCEDURE — 70498 CT ANGIOGRAPHY NECK: CPT

## 2018-08-12 PROCEDURE — 80053 COMPREHEN METABOLIC PANEL: CPT | Performed by: EMERGENCY MEDICINE

## 2018-08-12 RX ORDER — LIDOCAINE 4 G/G
PATCH TOPICAL
COMMUNITY
End: 2018-11-27 | Stop reason: ALTCHOICE

## 2018-08-12 RX ORDER — HYDRALAZINE HYDROCHLORIDE 20 MG/ML
5 INJECTION INTRAMUSCULAR; INTRAVENOUS ONCE
Status: COMPLETED | OUTPATIENT
Start: 2018-08-12 | End: 2018-08-12

## 2018-08-12 RX ORDER — CHLORAL HYDRATE 500 MG
1000 CAPSULE ORAL 2 TIMES DAILY
Status: DISCONTINUED | OUTPATIENT
Start: 2018-08-12 | End: 2018-08-14 | Stop reason: HOSPADM

## 2018-08-12 RX ORDER — LISINOPRIL 20 MG/1
20 TABLET ORAL ONCE
Status: COMPLETED | OUTPATIENT
Start: 2018-08-12 | End: 2018-08-12

## 2018-08-12 RX ORDER — LISINOPRIL 20 MG/1
20 TABLET ORAL DAILY
Status: DISCONTINUED | OUTPATIENT
Start: 2018-08-13 | End: 2018-08-14 | Stop reason: HOSPADM

## 2018-08-12 RX ORDER — LIDOCAINE 50 MG/G
1 PATCH TOPICAL DAILY
Status: DISCONTINUED | OUTPATIENT
Start: 2018-08-13 | End: 2018-08-14 | Stop reason: HOSPADM

## 2018-08-12 RX ORDER — MECLIZINE HCL 12.5 MG/1
12.5 TABLET ORAL EVERY 12 HOURS PRN
Status: DISCONTINUED | OUTPATIENT
Start: 2018-08-12 | End: 2018-08-14 | Stop reason: HOSPADM

## 2018-08-12 RX ORDER — B-COMPLEX WITH VITAMIN C
1 TABLET ORAL 2 TIMES DAILY
Status: DISCONTINUED | OUTPATIENT
Start: 2018-08-12 | End: 2018-08-14 | Stop reason: HOSPADM

## 2018-08-12 RX ORDER — AMANTADINE HYDROCHLORIDE 100 MG/1
100 CAPSULE, GELATIN COATED ORAL 2 TIMES DAILY
Status: DISCONTINUED | OUTPATIENT
Start: 2018-08-12 | End: 2018-08-14 | Stop reason: HOSPADM

## 2018-08-12 RX ORDER — ASPIRIN 81 MG/1
81 TABLET, CHEWABLE ORAL DAILY
Status: DISCONTINUED | OUTPATIENT
Start: 2018-08-13 | End: 2018-08-14 | Stop reason: HOSPADM

## 2018-08-12 RX ORDER — AMLODIPINE BESYLATE 5 MG/1
5 TABLET ORAL ONCE
Status: COMPLETED | OUTPATIENT
Start: 2018-08-12 | End: 2018-08-12

## 2018-08-12 RX ORDER — HEPARIN SODIUM 5000 [USP'U]/ML
5000 INJECTION, SOLUTION INTRAVENOUS; SUBCUTANEOUS EVERY 8 HOURS SCHEDULED
Status: DISCONTINUED | OUTPATIENT
Start: 2018-08-12 | End: 2018-08-14 | Stop reason: HOSPADM

## 2018-08-12 RX ORDER — ACETAMINOPHEN 325 MG/1
650 TABLET ORAL EVERY 6 HOURS PRN
Status: DISCONTINUED | OUTPATIENT
Start: 2018-08-12 | End: 2018-08-14 | Stop reason: HOSPADM

## 2018-08-12 RX ORDER — HYDRALAZINE HYDROCHLORIDE 20 MG/ML
10 INJECTION INTRAMUSCULAR; INTRAVENOUS EVERY 6 HOURS PRN
Status: DISCONTINUED | OUTPATIENT
Start: 2018-08-12 | End: 2018-08-14 | Stop reason: HOSPADM

## 2018-08-12 RX ORDER — ALLOPURINOL 100 MG/1
100 TABLET ORAL DAILY
Status: DISCONTINUED | OUTPATIENT
Start: 2018-08-13 | End: 2018-08-14 | Stop reason: HOSPADM

## 2018-08-12 RX ADMIN — HYDRALAZINE HYDROCHLORIDE 5 MG: 20 INJECTION INTRAMUSCULAR; INTRAVENOUS at 16:57

## 2018-08-12 RX ADMIN — IODIXANOL 100 ML: 320 INJECTION, SOLUTION INTRAVASCULAR at 13:59

## 2018-08-12 RX ADMIN — Medication 1000 MG: at 23:54

## 2018-08-12 RX ADMIN — CALCIUM CARBONATE-VITAMIN D TAB 500 MG-200 UNIT 1 TABLET: 500-200 TAB at 23:55

## 2018-08-12 RX ADMIN — LISINOPRIL 20 MG: 20 TABLET ORAL at 14:11

## 2018-08-12 RX ADMIN — HYDRALAZINE HYDROCHLORIDE 5 MG: 20 INJECTION INTRAMUSCULAR; INTRAVENOUS at 15:32

## 2018-08-12 RX ADMIN — AMANTADINE HYDROCHLORIDE 100 MG: 100 CAPSULE ORAL at 23:58

## 2018-08-12 RX ADMIN — HEPARIN SODIUM 5000 UNITS: 5000 INJECTION, SOLUTION INTRAVENOUS; SUBCUTANEOUS at 23:56

## 2018-08-12 RX ADMIN — AMLODIPINE BESYLATE 5 MG: 5 TABLET ORAL at 14:11

## 2018-08-12 NOTE — ED PROVIDER NOTES
History  Chief Complaint   Patient presents with    High Blood Pressure     Pt presents with elevated BP at home of 195/115  Pt also c/o increased blurred vision and feeling "unstable and mild vertigo "  Pt denies CP or headache  Pt states she does have a hx of Parkinson's but this "feels different "  Pt did not take BP meds at home today  HPI   75-year-old female with history of benign positional vertigo, Parkinson's disease, hypertension, who presents for evaluation of dizziness, intermittent blurry vision, and heaviness in all 4 limbs that started 4 days ago, as well as hypertension that she noticed this morning  Patient states that for the last 4 days she feels like all 4 of her limbs are becoming increasingly heavy    She denies that this is worse on 1 side as opposed to the other  States that her legs feel heavier than her arms  She has continued to be able to walk, but reports that this morning she felt unsteady  States that this feels different than her usual vertigo because the room isn't spinning around her, however Coni tavares feels unsteady    This sensation sometimes occurs at rest but mostly occurs with movement  Is not induced with turning the head from side to side  Reports checking her blood pressure this morning and finding that it was 180/120, prompting her to come into the ED for evaluation  She did not take her blood pressure medication this morning  Denies chest pain, shortness of breath, fevers, or chills  Reports pain in her right shoulder that has been present for months and makes it difficult for her to lift her right arm  Also reports chronic back pain that is not worse today than normal, and for which she has had imaging with her primary care doctor  She denies history of stroke prior stroke  Denies nausea or vomiting  Reports that her vision is not currently blurry      Of note, patient had an MRI MRA of her brain and carotids last July, which showed microvascular disease, but no acute ischemic changes  She also had an unremarkable MR angiogram of the cervical vasculature  Prior to Admission Medications   Prescriptions Last Dose Informant Patient Reported? Taking? Calcium Carb-Cholecalciferol (CALCIUM + D3) 600-200 MG-UNIT TABS   Yes No   Sig: Take 2 tablets by mouth 2 (two) times a day   Cholecalciferol (VITAMIN D) 2000 units CAPS   Yes No   Sig: Take 1 capsule by mouth 2 (two) times a day   Lidocaine 4 % PTCH   Yes Yes   Sig: Apply topically   Multiple Vitamins-Minerals (CENTRUM SILVER) tablet   Yes No   Sig: Take 1 tablet by mouth daily   Omega-3 Fatty Acids (FISH OIL) 1,000 mg   Yes No   Sig: Take 1 capsule by mouth 2 (two) times a day   allopurinol (ZYLOPRIM) 100 mg tablet   Yes No   Sig: Take 1 tablet by mouth daily   amantadine (SYMMETREL) 100 mg capsule 8/12/2018 at noon  No Yes   Sig: Take 1 capsule (100 mg total) by mouth 2 (two) times a day   aspirin 81 MG tablet 8/12/2018 at Unknown time  Yes Yes   Sig: Take 1 tablet by mouth daily   lisinopril (ZESTRIL) 20 mg tablet  Self Yes No   Sig: Take 20 mg by mouth daily     meclizine (ANTIVERT) 25 mg tablet   Yes No   Sig: Take 1 tablet by mouth 2 (two) times a day as needed      Facility-Administered Medications: None       Past Medical History:   Diagnosis Date    Arthritis     BPV (benign positional vertigo)     Gout     High cholesterol     Hypertension     Lumbar spinal stenosis     Parkinsons disease ([address] )     Strain of thoracic region     Tremor        Past Surgical History:   Procedure Laterality Date    APPENDECTOMY      CATARACT EXTRACTION      REPLACEMENT TOTAL KNEE BILATERAL      TONSILLECTOMY         Family History   Problem Relation Age of Onset    Anemia Mother     COPD Mother     Ulcers Father     Ulcers Brother     Hypotension Brother     Irregular heart beat Brother      I have reviewed and agree with the history as documented      Social History   Substance Use Topics    Smoking status: Former Smoker    Smokeless tobacco: Never Used    Alcohol use No        Review of Systems   Constitutional: Negative for chills, fatigue and fever  HENT: Negative for congestion  Eyes: Positive for visual disturbance (intermittent, not present currently)  Respiratory: Negative for cough and shortness of breath  Cardiovascular: Negative for chest pain and leg swelling  Gastrointestinal: Negative for abdominal pain, diarrhea, nausea and vomiting  Genitourinary: Negative for dysuria, flank pain and frequency  Musculoskeletal: Positive for arthralgias (R shoulder pain, chronic, unchanged), back pain (chronic, unchanged) and gait problem (shuffling, chronic)  Negative for neck pain and neck stiffness  Skin: Negative for rash  Neurological: Positive for dizziness and light-headedness  Negative for syncope, facial asymmetry, speech difficulty, weakness ("heaviness" to all 4 limbs), numbness and headaches  Psychiatric/Behavioral: Negative for agitation, behavioral problems and confusion  Physical Exam  Physical Exam   Constitutional: She is oriented to person, place, and time  She appears well-developed and well-nourished  No distress  HENT:   Head: Normocephalic and atraumatic  Right Ear: External ear normal    Left Ear: External ear normal    Nose: Nose normal    Mouth/Throat: Oropharynx is clear and moist    Eyes: Conjunctivae and EOM are normal  Pupils are equal, round, and reactive to light  Visual fields intact to confrontation  No nystagmus  Neck: Normal range of motion  Neck supple  Cardiovascular: Normal rate, regular rhythm, normal heart sounds and intact distal pulses  Exam reveals no gallop and no friction rub  No murmur heard  Pulmonary/Chest: Effort normal and breath sounds normal  No respiratory distress  She has no wheezes  She has no rales  Abdominal: Soft  Bowel sounds are normal  She exhibits no distension  There is no tenderness   There is no guarding  Musculoskeletal: Normal range of motion  She exhibits tenderness (TTP to the perispinous muscles of the thoracic and lumbar spine  )  She exhibits no edema or deformity  Neurological: She is alert and oriented to person, place, and time  She exhibits normal muscle tone  Face symmetric, tongue midline, 5/5 strength in the proximal and distal upper and lower extremities bilaterally with intact sensation to light touch throughout  CN II-XII intact  Normal finger-to-nose, rapid alternating movements, and heel-to-shin bilaterally  Normal speech  No truncal ataxia, but pt states she cannot stand without assistance because her limbs "feel heavy " No ankle clonus  Skin: Skin is warm and dry  She is not diaphoretic         Vital Signs  ED Triage Vitals [08/12/18 1231]   Temperature Pulse Respirations Blood Pressure SpO2   (!) 97 4 °F (36 3 °C) 64 18 (!) 238/102 97 %      Temp Source Heart Rate Source Patient Position - Orthostatic VS BP Location FiO2 (%)   Oral Monitor Sitting Right arm --      Pain Score       No Pain           Vitals:    08/12/18 1600 08/12/18 1630 08/12/18 1700 08/12/18 1829   BP: (!) 193/91 170/74 (!) 210/89 141/67   Pulse: 65 63  74   Patient Position - Orthostatic VS:    Lying       Visual Acuity      ED Medications  Medications   allopurinol (ZYLOPRIM) tablet 100 mg (not administered)   amantadine (SYMMETREL) capsule 100 mg (not administered)   aspirin chewable tablet 81 mg (not administered)   calcium carbonate-vitamin D (OSCAL-D) 500 mg-200 units per tablet 1 tablet (not administered)   lidocaine (LIDODERM) 5 % patch 1 patch (not administered)   lisinopril (ZESTRIL) tablet 20 mg (not administered)   meclizine (ANTIVERT) tablet 12 5 mg (not administered)   multivitamin-minerals (CENTRUM) tablet 1 tablet (not administered)   fish oil capsule 1,000 mg (not administered)   heparin (porcine) subcutaneous injection 5,000 Units (not administered)   acetaminophen (TYLENOL) tablet 650 mg (not administered)   hydrALAZINE (APRESOLINE) injection 10 mg (not administered)   amLODIPine (NORVASC) tablet 5 mg (5 mg Oral Given 8/12/18 1411)   lisinopril (ZESTRIL) tablet 20 mg (20 mg Oral Given 8/12/18 1411)   iodixanol (VISIPAQUE) 320 MG/ML injection 100 mL (100 mL Intravenous Given 8/12/18 1359)   hydrALAZINE (APRESOLINE) injection 5 mg (5 mg Intravenous Given 8/12/18 1532)   hydrALAZINE (APRESOLINE) injection 5 mg (5 mg Intravenous Given 8/12/18 1657)       Diagnostic Studies  Results Reviewed     Procedure Component Value Units Date/Time    Troponin I [[id number]]  (Normal) Collected:  08/12/18 1657    Lab Status:  Final result Specimen:  Blood from Arm, Left Updated:  08/12/18 1735     Troponin I 0 02 ng/mL     Urinalysis with microscopic [[id number]]  (Abnormal) Collected:  08/12/18 1432    Lab Status:  Final result Specimen:  Urine from Urine, Clean Catch Updated:  08/12/18 1506     Clarity, UA Clear     Color, UA Light Yellow     Specific Gravity, UA 1 015     pH, UA 6 0     Glucose, UA Negative mg/dl      Ketones, UA Negative mg/dl      Blood, UA Trace-Intact (A)     Protein, UA Negative mg/dl      Nitrite, UA Positive (A)     Bilirubin, UA Negative     Urobilinogen, UA 0 2 E U /dl      Leukocytes, UA Negative     WBC, UA 0-1 (A) /hpf      RBC, UA None Seen /hpf      Bacteria, UA Moderate (A) /hpf      Epithelial Cells Occasional /hpf     CBC and differential [[id number]] Collected:  08/12/18 1432    Lab Status:  Final result Specimen:  Blood from Arm, Right Updated:  08/12/18 1442     WBC 6 90 Thousand/uL      RBC 3 90 Million/uL      Hemoglobin 12 1 g/dL      Hematocrit 36 3 %      MCV 93 fL      MCH 31 0 pg      MCHC 33 3 g/dL      RDW 13 7 %      MPV 9 7 fL      Platelets 106 Thousands/uL      nRBC 0 /100 WBCs      Neutrophils Relative 73 %      Immat GRANS % 0 %      Lymphocytes Relative 15 %      Monocytes Relative 9 %      Eosinophils Relative 2 %      Basophils Relative 1 %      Neutrophils Absolute 5 00 Thousands/µL      Immature Grans Absolute 0 02 Thousand/uL      Lymphocytes Absolute 1 04 Thousands/µL      Monocytes Absolute 0 65 Thousand/µL      Eosinophils Absolute 0 15 Thousand/µL      Basophils Absolute 0 04 Thousands/µL     Comprehensive metabolic panel [[id number]] Collected:  08/12/18 1302    Lab Status:  Final result Specimen:  Blood from Arm, Left Updated:  08/12/18 1352     Sodium 141 mmol/L      Potassium 3 6 mmol/L      Chloride 106 mmol/L      CO2 25 mmol/L      Anion Gap 10 mmol/L      BUN 24 mg/dL      Creatinine 1 13 mg/dL      Glucose 92 mg/dL      Calcium 9 1 mg/dL      AST 15 U/L      ALT 16 U/L      Alkaline Phosphatase 97 U/L      Total Protein 7 0 g/dL      Albumin 3 9 g/dL      Total Bilirubin 0 50 mg/dL      eGFR 43 ml/min/1 73sq m     Narrative:         National Kidney Disease Education Program recommendations are as follows:  GFR calculation is accurate only with a steady state creatinine  Chronic Kidney disease less than 60 ml/min/1 73 sq  meters  Kidney failure less than 15 ml/min/1 73 sq  meters  Troponin I [[id number]]  (Normal) Collected:  08/12/18 1302    Lab Status:  Final result Specimen:  Blood from Arm, Left Updated:  08/12/18 1335     Troponin I <0 02 ng/mL                  CTA head and neck w wo contrast   Final Result by Sumanth Andino MD (08/12 9047)      No acute intracranial disease, diffuse generalized volume loss, chronic small vessel disease  No large vessel flow restrictive disease within the head or neck        * I personally telephoned this result to Hal Cabrales on 8/12/2018 4:02 PM              Workstation performed: [id number]         X-ray chest 2 views    (Results Pending)   XR shoulder 2+ views RIGHT    (Results Pending)              Procedures  Procedures       Phone Contacts  ED Phone Contact    ED Course                               MDM  Number of Diagnoses or Management Options  Hypertensive urgency:   Diagnosis management comments: Patient is nontoxic appearing  Afebrile and hemodynamically stable  She has a conglomeration of vague neurologic complaints as mentioned above, with a feeling of unsteadiness and heaviness in all 4 limbs  This is been going on for 4 days, and there is no focal deficit on exam to necessitate stroke alert  Patient does require assistance to stand, stating that she feels like her legs are heavy  No hyperreflexia, ankle clonus, or bowel or bladder incontinence to suggest spinal cord lesion or cauda equina  CTA head and neck performed with no acute evidence of CVA  X-ray performed of the right shoulder is patient has difficulty fully abducting her right arm  I do not see any acute fractures or malalignment  I personally interpreted the patient's EKG, which shows sinus rhythm with occasional PVCs  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  No over ischemic changes  No prior available for comparison  First troponin is undetectable  Second pending  CBC and CMP were unremarkable  Patient's urine has positive nitrites, leuks, and bacteria, but will not treat at this time is the patient denies dysuria, frequency, or lower abdominal pain  Blood pressure is improved to 193/91 with the patient's home blood pressure meds as well as 2 doses of IV hydralazine  Patient states the heaviness in her limbs seems to has resolved have resolved, but she continues to feel unsteady  Lack of abnormalities with cerebellar testing and lack of truncal ataxia combined with negative CTA makes cerebellar stroke less likely as the cause of her symptoms, but differential diagnosis include hypertensive urgency versus progression of her Parkinson's disease verses stroke  Plan to admit to Medicine for further workup  Patient will likely require a neurology consult in the morning          CritCare Time    Disposition  Final diagnoses:   Hypertensive urgency     Time reflects when diagnosis was documented in both MDM as applicable and the Disposition within this note     Time User Action Codes Description Comment    8/12/2018  4:32 PM Tete Rocha Add [I16 0] Hypertensive urgency       ED Disposition     ED Disposition Condition Comment    Admit  Case was discussed with YESSICA and the patient's admission status was agreed to be Admission Status: observation status to the service of Dr Jag Ramey   Follow-up Information    None         Current Discharge Medication List      CONTINUE these medications which have NOT CHANGED    Details   amantadine (SYMMETREL) 100 mg capsule Take 1 capsule (100 mg total) by mouth 2 (two) times a day  Qty: 60 capsule, Refills: 3    Associated Diagnoses: PD (Parkinson's disease) ([facility])      aspirin 81 MG tablet Take 1 tablet by mouth daily      Lidocaine 4 % PTCH Apply topically      allopurinol (ZYLOPRIM) 100 mg tablet Take 1 tablet by mouth daily      Calcium Carb-Cholecalciferol (CALCIUM + D3) 600-200 MG-UNIT TABS Take 2 tablets by mouth 2 (two) times a day      Cholecalciferol (VITAMIN D) 2000 units CAPS Take 1 capsule by mouth 2 (two) times a day      lisinopril (ZESTRIL) 20 mg tablet Take 20 mg by mouth daily        meclizine (ANTIVERT) 25 mg tablet Take 1 tablet by mouth 2 (two) times a day as needed      Multiple Vitamins-Minerals (CENTRUM SILVER) tablet Take 1 tablet by mouth daily      Omega-3 Fatty Acids (FISH OIL) 1,000 mg Take 1 capsule by mouth 2 (two) times a day           No discharge procedures on file      ED Provider  Electronically Signed by           Daisy Baeza MD  08/12/18 0041

## 2018-08-13 ENCOUNTER — APPOINTMENT (OUTPATIENT)
Dept: NON INVASIVE DIAGNOSTICS | Facility: HOSPITAL | Age: 83
DRG: 305 | End: 2018-08-13
Payer: MEDICARE

## 2018-08-13 PROBLEM — E78.00 HIGH CHOLESTEROL: Status: ACTIVE | Noted: 2018-08-13

## 2018-08-13 PROBLEM — E87.6 HYPOKALEMIA: Status: ACTIVE | Noted: 2018-08-13

## 2018-08-13 PROBLEM — M10.9 GOUT: Status: ACTIVE | Noted: 2018-08-13

## 2018-08-13 PROBLEM — H81.10 BPV (BENIGN POSITIONAL VERTIGO): Status: ACTIVE | Noted: 2018-08-13

## 2018-08-13 LAB
ANION GAP SERPL CALCULATED.3IONS-SCNC: 12 MMOL/L (ref 4–13)
BASOPHILS # BLD AUTO: 0.06 THOUSANDS/ΜL (ref 0–0.1)
BASOPHILS NFR BLD AUTO: 1 % (ref 0–1)
BUN SERPL-MCNC: 22 MG/DL (ref 5–25)
CALCIUM SERPL-MCNC: 9 MG/DL (ref 8.3–10.1)
CHLORIDE SERPL-SCNC: 105 MMOL/L (ref 100–108)
CHOLEST SERPL-MCNC: 127 MG/DL (ref 50–200)
CO2 SERPL-SCNC: 24 MMOL/L (ref 21–32)
CREAT SERPL-MCNC: 1.09 MG/DL (ref 0.6–1.3)
EOSINOPHIL # BLD AUTO: 0.12 THOUSAND/ΜL (ref 0–0.61)
EOSINOPHIL NFR BLD AUTO: 1 % (ref 0–6)
ERYTHROCYTE [DISTWIDTH] IN BLOOD BY AUTOMATED COUNT: 13.9 % (ref 11.6–15.1)
GFR SERPL CREATININE-BSD FRML MDRD: 45 ML/MIN/1.73SQ M
GLUCOSE P FAST SERPL-MCNC: 100 MG/DL (ref 65–99)
GLUCOSE SERPL-MCNC: 100 MG/DL (ref 65–140)
HCT VFR BLD AUTO: 36.3 % (ref 34.8–46.1)
HDLC SERPL-MCNC: 60 MG/DL (ref 40–60)
HGB BLD-MCNC: 12 G/DL (ref 11.5–15.4)
IMM GRANULOCYTES # BLD AUTO: 0.04 THOUSAND/UL (ref 0–0.2)
IMM GRANULOCYTES NFR BLD AUTO: 0 % (ref 0–2)
LDLC SERPL CALC-MCNC: 48 MG/DL (ref 0–100)
LYMPHOCYTES # BLD AUTO: 0.56 THOUSANDS/ΜL (ref 0.6–4.47)
LYMPHOCYTES NFR BLD AUTO: 6 % (ref 14–44)
MCH RBC QN AUTO: 31.1 PG (ref 26.8–34.3)
MCHC RBC AUTO-ENTMCNC: 33.1 G/DL (ref 31.4–37.4)
MCV RBC AUTO: 94 FL (ref 82–98)
MONOCYTES # BLD AUTO: 0.76 THOUSAND/ΜL (ref 0.17–1.22)
MONOCYTES NFR BLD AUTO: 8 % (ref 4–12)
NEUTROPHILS # BLD AUTO: 7.98 THOUSANDS/ΜL (ref 1.85–7.62)
NEUTS SEG NFR BLD AUTO: 84 % (ref 43–75)
NONHDLC SERPL-MCNC: 67 MG/DL
NRBC BLD AUTO-RTO: 0 /100 WBCS
PLATELET # BLD AUTO: 161 THOUSANDS/UL (ref 149–390)
PLATELET # BLD AUTO: 200 THOUSANDS/UL (ref 149–390)
PMV BLD AUTO: 10.1 FL (ref 8.9–12.7)
PMV BLD AUTO: 10.8 FL (ref 8.9–12.7)
POTASSIUM SERPL-SCNC: 3.1 MMOL/L (ref 3.5–5.3)
RBC # BLD AUTO: 3.86 MILLION/UL (ref 3.81–5.12)
SODIUM SERPL-SCNC: 141 MMOL/L (ref 136–145)
TRIGL SERPL-MCNC: 97 MG/DL
WBC # BLD AUTO: 9.52 THOUSAND/UL (ref 4.31–10.16)

## 2018-08-13 PROCEDURE — 99221 1ST HOSP IP/OBS SF/LOW 40: CPT | Performed by: INTERNAL MEDICINE

## 2018-08-13 PROCEDURE — 80048 BASIC METABOLIC PNL TOTAL CA: CPT | Performed by: INTERNAL MEDICINE

## 2018-08-13 PROCEDURE — 85025 COMPLETE CBC W/AUTO DIFF WBC: CPT | Performed by: INTERNAL MEDICINE

## 2018-08-13 PROCEDURE — 93306 TTE W/DOPPLER COMPLETE: CPT

## 2018-08-13 PROCEDURE — 99232 SBSQ HOSP IP/OBS MODERATE 35: CPT | Performed by: PHYSICIAN ASSISTANT

## 2018-08-13 PROCEDURE — 85049 AUTOMATED PLATELET COUNT: CPT | Performed by: INTERNAL MEDICINE

## 2018-08-13 PROCEDURE — 93306 TTE W/DOPPLER COMPLETE: CPT | Performed by: INTERNAL MEDICINE

## 2018-08-13 PROCEDURE — 80061 LIPID PANEL: CPT | Performed by: PHYSICIAN ASSISTANT

## 2018-08-13 RX ORDER — POTASSIUM CHLORIDE 20 MEQ/1
40 TABLET, EXTENDED RELEASE ORAL ONCE
Status: COMPLETED | OUTPATIENT
Start: 2018-08-13 | End: 2018-08-13

## 2018-08-13 RX ORDER — PRAMIPEXOLE DIHYDROCHLORIDE 0.25 MG/1
0.25 TABLET ORAL 3 TIMES DAILY
Status: DISCONTINUED | OUTPATIENT
Start: 2018-08-13 | End: 2018-08-14 | Stop reason: HOSPADM

## 2018-08-13 RX ORDER — PRAMIPEXOLE DIHYDROCHLORIDE 0.12 MG/1
0.25 TABLET ORAL 3 TIMES DAILY
COMMUNITY
End: 2018-10-01 | Stop reason: SDUPTHER

## 2018-08-13 RX ADMIN — AMANTADINE HYDROCHLORIDE 100 MG: 100 CAPSULE ORAL at 20:30

## 2018-08-13 RX ADMIN — HEPARIN SODIUM 5000 UNITS: 5000 INJECTION, SOLUTION INTRAVENOUS; SUBCUTANEOUS at 13:09

## 2018-08-13 RX ADMIN — ALLOPURINOL 100 MG: 100 TABLET ORAL at 09:14

## 2018-08-13 RX ADMIN — POTASSIUM CHLORIDE 40 MEQ: 1500 TABLET, EXTENDED RELEASE ORAL at 13:07

## 2018-08-13 RX ADMIN — PRAMIPEXOLE DIHYDROCHLORIDE 0.25 MG: 0.25 TABLET ORAL at 16:39

## 2018-08-13 RX ADMIN — LISINOPRIL 20 MG: 20 TABLET ORAL at 09:14

## 2018-08-13 RX ADMIN — PRAMIPEXOLE DIHYDROCHLORIDE 0.25 MG: 0.25 TABLET ORAL at 20:31

## 2018-08-13 RX ADMIN — HEPARIN SODIUM 5000 UNITS: 5000 INJECTION, SOLUTION INTRAVENOUS; SUBCUTANEOUS at 22:36

## 2018-08-13 RX ADMIN — CALCIUM CARBONATE-VITAMIN D TAB 500 MG-200 UNIT 1 TABLET: 500-200 TAB at 09:14

## 2018-08-13 RX ADMIN — Medication 1000 MG: at 09:14

## 2018-08-13 RX ADMIN — Medication 1 TABLET: at 09:14

## 2018-08-13 RX ADMIN — AMANTADINE HYDROCHLORIDE 100 MG: 100 CAPSULE ORAL at 13:07

## 2018-08-13 RX ADMIN — HEPARIN SODIUM 5000 UNITS: 5000 INJECTION, SOLUTION INTRAVENOUS; SUBCUTANEOUS at 06:14

## 2018-08-13 RX ADMIN — ASPIRIN 81 MG 81 MG: 81 TABLET ORAL at 09:14

## 2018-08-13 RX ADMIN — Medication 1000 MG: at 18:13

## 2018-08-13 RX ADMIN — ACETAMINOPHEN 650 MG: 325 TABLET, FILM COATED ORAL at 09:26

## 2018-08-13 RX ADMIN — LIDOCAINE 1 PATCH: 50 PATCH TOPICAL at 09:13

## 2018-08-13 RX ADMIN — PRAMIPEXOLE DIHYDROCHLORIDE 0.25 MG: 0.25 TABLET ORAL at 09:14

## 2018-08-13 RX ADMIN — CALCIUM CARBONATE-VITAMIN D TAB 500 MG-200 UNIT 1 TABLET: 500-200 TAB at 18:13

## 2018-08-13 NOTE — CONSULTS
Consultation - Cardiology Team One  Justin Andrews 80 y o  female MRN: [id number]  Unit/Bed#: -01 Encounter: [id number]    Inpatient consult to Cardiology  Consult performed by: Sarah Manzanares ordered by: Leonardo Ceballos          Physician Requesting Consult: Xochilt Guillen MD  Reason for Consult / Principal Problem:  Hypertensive urgency    HPI: Cardiologist Dr Alves Tequiladiana is a 80y o  year old female who has a history of hypertension, hyperlipidemia, vertigo, Parkinson's presenting with hypertensive urgency  Patient states that she has had headache for few weeks, however acutely worsened over the past 2 days accompanied by vision changes  Patient was have found to have significantly elevated BP in ER  Initial /102  Was recently discontinued off lisinopril  Since admission, patient has received multiple doses of hydralazine, p o  lisinopril, p o  Norvasc  BP has improved  Denies chest pain, SOB, lightheadedness, palpitations, leg swelling, syncope  REVIEW OF SYSTEMS:  Constitutional:  Denies fever or chills   Eyes:  +visual changes  HENT:  Denies nasal congestion or sore throat   Respiratory:  Denies cough or shortness of breath   Cardiovascular:  Denies chest pain or edema   GI:  Denies abdominal pain, nausea, vomiting, bloody stools or diarrhea   :  Denies dysuria, frequency, difficulty in micturition and nocturia  Musculoskeletal:  Denies back pain or joint pain   Neurologic:  +headache   Denies focal weakness or sensory changes   Endocrine:  Denies polyuria or polydipsia   Lymphatic:  Denies swollen glands   Psychiatric:  Denies depression or anxiety     Historical Information   Past Medical History:   Diagnosis Date    Arthritis     BPV (benign positional vertigo)     Gout     High cholesterol     Hypertension     Lumbar spinal stenosis     Parkinsons disease ([facility] [address] [facility]     Strain of thoracic region     Tremor      Past Surgical History:   Procedure Laterality Date    APPENDECTOMY      CATARACT EXTRACTION      REPLACEMENT TOTAL KNEE BILATERAL      TONSILLECTOMY       History   Alcohol Use No     History   Drug Use No     History   Smoking Status    Former Smoker   Smokeless Tobacco    Never Used       Family History:   Family History   Problem Relation Age of Onset    Anemia Mother     COPD Mother     Ulcers Father     Ulcers Brother     Hypotension Brother     Irregular heart beat Brother        MEDS & ALLERGIES:  all current active meds have been reviewed and current meds: Current Facility-Administered Medications   Medication Dose Route Frequency    acetaminophen (TYLENOL) tablet 650 mg  650 mg Oral Q6H PRN    allopurinol (ZYLOPRIM) tablet 100 mg  100 mg Oral Daily    amantadine (SYMMETREL) capsule 100 mg  100 mg Oral BID    aspirin chewable tablet 81 mg  81 mg Oral Daily    calcium carbonate-vitamin D (OSCAL-D) 500 mg-200 units per tablet 1 tablet  1 tablet Oral BID    fish oil capsule 1,000 mg  1,000 mg Oral BID    heparin (porcine) subcutaneous injection 5,000 Units  5,000 Units Subcutaneous Q8H [facility]    hydrALAZINE (APRESOLINE) injection 10 mg  10 mg Intravenous Q6H PRN    lidocaine (LIDODERM) 5 % patch 1 patch  1 patch Transdermal Daily    lisinopril (ZESTRIL) tablet 20 mg  20 mg Oral Daily    meclizine (ANTIVERT) tablet 12 5 mg  12 5 mg Oral Q12H PRN    multivitamin-minerals (CENTRUM) tablet 1 tablet  1 tablet Oral Daily    pramipexole (MIRAPEX) tablet 0 25 mg  0 25 mg Oral TID        Allergies   Allergen Reactions    Cephalosporins Rash    Penicillin V Rash       OBJECTIVE:  Vitals:   Vitals:    08/13/18 0700   BP: 145/65   Pulse: 80   Resp: 18   Temp: 97 9 °F (36 6 °C)   SpO2: 95%     Body mass index is 33 37 kg/m²      Systolic (33DNP), WKL:493 , Min:135 , FRX:143     Diastolic (91OIT), CKZ:61, Min:63, Max:163    No intake or output data in the 24 hours ending 08/13/18 1026  Weight (last 2 days)     Date/Time   Weight 08/13/18 0600  80 1 (176 59)    08/12/18 1829  82 2 (181 22)    08/12/18 1231  79 4 (175 05)            Invasive Devices     Peripheral Intravenous Line            Peripheral IV 08/12/18 Left Hand less than 1 day                PHYSICAL EXAMS:  General:  Patient is not in acute distress, laying in the bed comfortably, awake, alert responding to commands  Head: Normocephalic, Atraumatic  HEENT: White sclera, pink conjunctiva,  PERRLA,pharynx benign  Neck:  Supple, no neck vein distention, carotids+2/+2 no bruits, thyromegaly, adenopathy  Respiratory: clear to P/A  Cardiovascular:  PMI normal, S1-S2 normal, No  Murmurs, thrills, gallops, rubs   Regular rhythm  GI:  Abdomen soft nontender   No hepatosplenomegaly, adenopathy, ascites,or rebound tenderness  Extremities: No edema, normal pulses, no calf tenderness, no joint deformities, no venous disease   Integument:  No skin rashes or ulceration  Lymphatic:  No cervical or inguinal lymphadenopathy  Neurologic:  Patient is awake alert, responding to command, well-oriented to time and place and person moving all extremities    LABORATORY RESULTS:    Results from last 7 days  Lab Units 08/12/18  1657 08/12/18  1302   TROPONIN I ng/mL 0 02 <0 02     CBC with diff:   Results from last 7 days  Lab Units 08/13/18  0520 08/12/18  1432   WBC Thousand/uL 9 52 6 90   HEMOGLOBIN g/dL 12 0 12 1   HEMATOCRIT % 36 3 36 3   MCV fL 94 93   PLATELETS Thousands/uL 200 206   MCH pg 31 1 31 0   MCHC g/dL 33 1 33 3   RDW % 13 9 13 7   MPV fL 10 1 9 7   NRBC AUTO /100 WBCs 0 0       CMP:  Results from last 7 days  Lab Units 08/13/18  0520 08/12/18  1302   SODIUM mmol/L 141 141   POTASSIUM mmol/L 3 1* 3 6   CHLORIDE mmol/L 105 106   CO2 mmol/L 24 25   ANION GAP mmol/L 12 10   BUN mg/dL 22 24   CREATININE mg/dL 1 09 1 13   GLUCOSE RANDOM mg/dL 100 92   CALCIUM mg/dL 9 0 9 1   AST U/L  --  15   ALT U/L  --  16   ALK PHOS U/L  --  97   TOTAL PROTEIN g/dL  --  7 0   BILIRUBIN TOTAL mg/dL  -- 0 50   EGFR ml/min/1 73sq m 45 43       BMP:  Results from last 7 days  Lab Units 08/13/18  0520 08/12/18  1302   SODIUM mmol/L 141 141   POTASSIUM mmol/L 3 1* 3 6   CHLORIDE mmol/L 105 106   CO2 mmol/L 24 25   BUN mg/dL 22 24   CREATININE mg/dL 1 09 1 13   GLUCOSE RANDOM mg/dL 100 92   CALCIUM mg/dL 9 0 9 1                              Lipid Profile:   No results found for: CHOL  No results found for: HDL  No results found for: LDLCALC  No results found for: TRIG    Cardiac testing:   No results found for this or any previous visit  No results found for this or any previous visit  No procedure found  No results found for this or any previous visit  Imaging:   I have personally reviewed pertinent reports  EKG reviewed personally:  NSR with PACs and PVCs  Bifascicular block  Assessment/Plan:  1  Hypertensive urgency, Hx HTN:  Initial /102  Last recorded /65  Patient was restarted on lisinopril with improvement in BP  Continue present regimen, continue to monitor  Will order echocardiogram to assess EF and regional wall motion abnormalities  2   Hyperlipidemia:  On fish oil  Not on statin  Will order lipid panel  Code Status: Level 3 - DNAR and DNI    Counseling / Coordination of Care  Total floor / unit time spent today 35 minutes  Greater than 50% of total time was spent with the patient and / or family counseling and / or coordination of care  A description of the counseling / coordination of care: Review of history, current assessment, development of a plan      Matheus Donovan PA-C  8/13/2018,10:26 AM

## 2018-08-13 NOTE — ASSESSMENT & PLAN NOTE
· Patient utilizes amantadine and pramipexole per her neurology plan of care   · She sees Dr Liang Caceres  Will continue her home medication doses

## 2018-08-13 NOTE — ASSESSMENT & PLAN NOTE
Patient utilizes amantadine and pramipexole per her neurology plan of care   She sees Dr Lin Lighter    5 will continue her home medication doses

## 2018-08-13 NOTE — H&P
H&P- Griselda Bars 5/8/1930, 80 y o  female MRN: [id number]    Unit/Bed#: -01 Encounter: [id number]    Primary Care Provider: Raquel Hope DO   Date and time admitted to hospital: 8/12/2018 12:23 PM        Hypertensive urgency   Assessment & Plan    Present on admission, patient reports 2 days slight headache and feeling of confusion with some visual changes  Patient reports that she thought that it was her blood pressure  She took her pressure this morning and found to be 190  Patient reports that she recently was discontinued off of Norvasc because of dizziness  She was diagnosed with BVP  · Resume Norvasc 5 mg  · Resume lisinopril 10 mg  · Hydralazine 10 mg IV Q 6 p r n  for elevated blood pressures greater than 180  PD (Parkinson's disease) [facility])   Assessment & Plan    Patient utilizes amantadine and pramipexole per her neurology plan of care   She sees Dr iRley Figueroa  5 will continue her home medication doses        Strain of thoracic region   Assessment & Plan    Patient wears a lidocaine patch will continue  Gout   Assessment & Plan    Currently not an active flare  Continue allopurinol        High cholesterol   Assessment & Plan    Patient reports she is no longer on cholesterol medication  Will continue her fish oil only  BPV (benign positional vertigo)   Assessment & Plan    Currently stable  Will use meclizine p r n  Obesity:  Will request nutritional counseling  VTE Prophylaxis: Heparin  / sequential compression device   Code Status:  Do not resuscitate  POLST: POLST form is not discussed and not completed at this time  Discussion with family:   None present    Anticipated Length of Stay:  Patient will be admitted on an Observation basis with an anticipated length of stay of   less thanlength of stay of 2 midnights     Justification for Hospital Stay:  Titration of medication and observation for further symptoms    Total Time for Visit, including Counseling / Coordination of Care: 1 hour  Greater than 50% of this total time spent on direct patient counseling and coordination of care  Chief Complaint:     Headache and slight confusion    History of Present Illness:    Lance Colindres is a 80 y o  female who presents with 2 days of headache slight confusion and visual changes  The patient reports that she was taken off of 1 of her blood pressure medications back in June because of vertigo symptoms  She has been seeing Neurology for her parkinsonism and vertigo  She was diagnosed with benign positional vertigo at that time  She states that since that time she had on and off headaches and changes in vision but in the last 48 hours had increased significantly and she felt confused  On arrival to the emergency room her blood pressure was found to be greater than 689 systolic Anali  She was treated with oral vancomycin and lisinopril as well as 2 doses of hydralazine  Over the course of the afternoon her blood pressure has come down into the 1 90T to 807 systolicly  Patient will be admitted for further titration of medications  Patient currently states that her headache is just above the patient for nose between her eyes and has improved  She reports no nausea vomiting or chest pain    Review of Systems:    Review of Systems   Constitutional: Negative for appetite change, chills, diaphoresis, fatigue, fever and unexpected weight change  HENT: Negative for dental problem, ear discharge, ear pain, facial swelling, hearing loss, mouth sores, nosebleeds and rhinorrhea  Eyes: Negative for pain, discharge, redness and visual disturbance  Respiratory: Negative for cough, chest tightness, shortness of breath and wheezing  Cardiovascular: Negative for chest pain, palpitations and leg swelling  Gastrointestinal: Negative for abdominal distention, abdominal pain, blood in stool, constipation, diarrhea, nausea and vomiting     Endocrine: Negative for cold intolerance, heat intolerance, polydipsia, polyphagia and polyuria  Genitourinary: Negative for dysuria, frequency, hematuria and urgency  Musculoskeletal: Positive for back pain  Negative for arthralgias  Skin: Negative for rash and wound  Neurological: Positive for dizziness, tremors, light-headedness and headaches  Negative for weakness and numbness  Hematological: Negative for adenopathy  Does not bruise/bleed easily  Psychiatric/Behavioral: Negative for confusion and dysphoric mood  Past Medical and Surgical History:     Past Medical History:   Diagnosis Date    Arthritis     BPV (benign positional vertigo)     Gout     High cholesterol     Hypertension     Lumbar spinal stenosis     Parkinsons disease ([address] )     Strain of thoracic region     Tremor        Past Surgical History:   Procedure Laterality Date    APPENDECTOMY      CATARACT EXTRACTION      REPLACEMENT TOTAL KNEE BILATERAL      TONSILLECTOMY         Meds/Allergies:    Prior to Admission medications    Medication Sig Start Date End Date Taking?  Authorizing Provider   amantadine (SYMMETREL) 100 mg capsule Take 1 capsule (100 mg total) by mouth 2 (two) times a day 7/26/18  Yes Xenia Gómez MD   aspirin 81 MG tablet Take 1 tablet by mouth daily   Yes Historical Provider, MD   Lidocaine 4 % PTCH Apply topically   Yes Historical Provider, MD   pramipexole (MIRAPEX) 0 125 mg tablet Take 0 25 mg by mouth 3 (three) times a day   Yes Historical Provider, MD   allopurinol (ZYLOPRIM) 100 mg tablet Take 1 tablet by mouth daily    Historical Provider, MD   Calcium Carb-Cholecalciferol (CALCIUM + D3) 600-200 MG-UNIT TABS Take 2 tablets by mouth 2 (two) times a day    Historical Provider, MD   Cholecalciferol (VITAMIN D) 2000 units CAPS Take 1 capsule by mouth 2 (two) times a day    Historical Provider, MD   lisinopril (ZESTRIL) 20 mg tablet Take 20 mg by mouth daily   3/19/18   Historical Provider, MD   meclizine (ANTIVERT) 25 mg tablet Take 1 tablet by mouth 2 (two) times a day as needed 6/20/17   Historical Provider, MD   Multiple Vitamins-Minerals (CENTRUM SILVER) tablet Take 1 tablet by mouth daily    Historical Provider, MD   Omega-3 Fatty Acids (FISH OIL) 1,000 mg Take 1 capsule by mouth 2 (two) times a day    Historical Provider, MD     I have reviewed home medications with patient personally  Allergies: Allergies   Allergen Reactions    Cephalosporins Rash    Penicillin V Rash       Social History:     Marital Status:    Occupation:  Patient is retired nurse  Patient Pre-hospital Living Situation:  Lives at her own home  Patient Pre-hospital Level of Mobility:   Able to ambulate  Patient Pre-hospital Diet Restrictions:  No restrictions  Substance Use History:   History   Alcohol Use No     History   Smoking Status    Former Smoker   Smokeless Tobacco    Never Used     History   Drug Use No       Family History:    Family History   Problem Relation Age of Onset    Anemia Mother     COPD Mother     Ulcers Father     Ulcers Brother     Hypotension Brother     Irregular heart beat Brother        Physical Exam:     Vitals:   Blood Pressure: 137/65 (08/12/18 2336)  Pulse: 63 (08/12/18 2336)  Temperature: 98 2 °F (36 8 °C) (08/12/18 2336)  Temp Source: Oral (08/12/18 2336)  Respirations: 18 (08/12/18 2336)  Height: 5' 1" (154 9 cm) (08/12/18 1829)  Weight - Scale: 82 2 kg (181 lb 3 5 oz) (08/12/18 1829)  SpO2: 94 % (08/12/18 2336)    Physical Exam   Constitutional: She is oriented to person, place, and time  She appears well-developed and well-nourished  No distress  Overweight   HENT:   Head: Normocephalic and atraumatic  Right Ear: External ear normal    Left Ear: External ear normal    Nose: Nose normal    Mouth/Throat: Oropharynx is clear and moist  No oropharyngeal exudate  Eyes: Conjunctivae and EOM are normal  Pupils are equal, round, and reactive to light  Right eye exhibits no discharge   Left eye exhibits no discharge  No scleral icterus  Neck: Normal range of motion  Neck supple  No JVD present  No thyromegaly present  Cardiovascular: Normal rate, regular rhythm, normal heart sounds and intact distal pulses  Exam reveals no gallop and no friction rub  No murmur heard  Pulmonary/Chest: Breath sounds normal  No respiratory distress  She has no wheezes  She has no rales  Abdominal: Soft  Bowel sounds are normal  She exhibits no distension  There is no tenderness  There is no rebound and no guarding  Musculoskeletal: Normal range of motion  She exhibits no edema or deformity  Lymphadenopathy:     She has no cervical adenopathy  Neurological: She is alert and oriented to person, place, and time  She has normal reflexes  No cranial nerve deficit  She exhibits normal muscle tone  Positive masked facies, no nystagmus pupils are equal round and reactive to light positive resting tremor right upper extremity left lower extremity mild increase   Skin: Skin is warm and dry  No rash noted  She is not diaphoretic  No erythema  Psychiatric: She has a normal mood and affect  Vitals reviewed  Additional Data:     Lab Results: I have personally reviewed pertinent reports  Results from last 7 days  Lab Units 08/12/18  1432   WBC Thousand/uL 6 90   HEMOGLOBIN g/dL 12 1   HEMATOCRIT % 36 3   PLATELETS Thousands/uL 206   NEUTROS PCT % 73   LYMPHS PCT % 15   MONOS PCT % 9   EOS PCT % 2       Results from last 7 days  Lab Units 08/12/18  1302   SODIUM mmol/L 141   POTASSIUM mmol/L 3 6   CHLORIDE mmol/L 106   CO2 mmol/L 25   BUN mg/dL 24   CREATININE mg/dL 1 13   CALCIUM mg/dL 9 1   TOTAL PROTEIN g/dL 7 0   BILIRUBIN TOTAL mg/dL 0 50   ALK PHOS U/L 97   ALT U/L 16   AST U/L 15   GLUCOSE RANDOM mg/dL 92                   Imaging: I have personally reviewed pertinent reports        CTA head and neck w wo contrast   Final Result by Zay Vincent MD (08/12 8911)      No acute intracranial disease, diffuse generalized volume loss, chronic small vessel disease  No large vessel flow restrictive disease within the head or neck  * I personally telephoned this result to Funmi Lobato on 2018 4:02 PM              Workstation performed: [id number]         X-ray chest 2 views    (Results Pending)   XR shoulder 2+ views RIGHT    (Results Pending)       EKG, Pathology, and Other Studies Reviewed on Admission:   · EK beats per minute normal sinus rhythm with PVCs and PACs  She has a bifascicular block    Allscripts / Epic Records Reviewed: Yes     ** Please Note: This note has been constructed using a voice recognition system   **

## 2018-08-13 NOTE — PLAN OF CARE
CARDIOVASCULAR - ADULT     Maintains optimal cardiac output and hemodynamic stability Progressing     Absence of cardiac dysrhythmias or at baseline rhythm Progressing        METABOLIC, FLUID AND ELECTROLYTES - ADULT     Electrolytes maintained within normal limits Progressing     Fluid balance maintained Progressing     Glucose maintained within target range Progressing        MUSCULOSKELETAL - ADULT     Maintain or return mobility to safest level of function Progressing     Maintain proper alignment of affected body part Progressing        Potential for Falls     Patient will remain free of falls Progressing        SAFETY ADULT     Maintain or return to baseline ADL function Progressing     Maintain or return mobility status to optimal level Progressing        SKIN/TISSUE INTEGRITY - ADULT     Skin integrity remains intact Progressing     Incision(s), wounds(s) or drain site(s) healing without S/S of infection Progressing     Oral mucous membranes remain intact Progressing

## 2018-08-13 NOTE — CASE MANAGEMENT
Initial Clinical Review    Admission: Date/Time/Statement: 08/12/18 @ 1633 Observation Written     Orders Placed This Encounter   Procedures    Place in Observation (expected length of stay for this patient is less than two midnights)     Standing Status:   Standing     Number of Occurrences:   1     Order Specific Question:   Admitting Physician     Answer:   Mandy Hua     Order Specific Question:   Level of Care     Answer:   Med Surg [16]         ED: Date/Time/Mode of Arrival:   ED Arrival Information     Expected Arrival Acuity Means of Arrival Escorted By Service Admission Type    - 8/12/2018 12:17 Emergent Walk-In Family Member General Medicine Emergency    Arrival Complaint    HIGH BP          Chief Complaint:   Chief Complaint   Patient presents with    High Blood Pressure     Pt presents with elevated BP at home of 195/115  Pt also c/o increased blurred vision and feeling "unstable and mild vertigo "  Pt denies CP or headache  Pt states she does have a hx of Parkinson's but this "feels different "  Pt did not take BP meds at home today  History of Illness: 80-year-old female with history of benign positional vertigo, Parkinson's disease, hypertension, who presents for evaluation of dizziness, intermittent blurry vision, and heaviness in all 4 limbs that started 4 days ago, as well as hypertension that she noticed this morning  Patient states that for the last 4 days she feels like all 4 of her limbs are becoming increasingly heavy    She denies that this is worse on 1 side as opposed to the other  States that her legs feel heavier than her arms  She has continued to be able to walk, but reports that this morning she felt unsteady  States that this feels different than her usual vertigo because the room isn't spinning around her, however Anthony tavares feels unsteady    This sensation sometimes occurs at rest but mostly occurs with movement    Is not induced with turning the head from side to side  Reports checking her blood pressure this morning and finding that it was 180/120, prompting her to come into the ED for evaluation  She did not take her blood pressure medication this morning  Denies chest pain, shortness of breath, fevers, or chills  Reports pain in her right shoulder that has been present for months and makes it difficult for her to lift her right arm  Also reports chronic back pain that is not worse today than normal, and for which she has had imaging with her primary care doctor  She denies history of stroke prior stroke  Denies nausea or vomiting  Reports that her vision is not currently blurry      Of note, patient had an MRI MRA of her brain and carotids last July, which showed microvascular disease, but no acute ischemic changes  She also had an unremarkable MR angiogram of the cervical vasculature  ED Vital Signs:   ED Triage Vitals [18 1231]   Temperature Pulse Respirations Blood Pressure SpO2   (!) 97 4 °F (36 3 °C) 64 18 (!) 238/102 97 %      Temp Source Heart Rate Source Patient Position - Orthostatic VS BP Location FiO2 (%)   Oral Monitor Sitting Right arm --      Pain Score       No Pain        Wt Readings from Last 1 Encounters:   18 80 1 kg (176 lb 9 4 oz)       Vital Signs (abnormal):  Date/Time  Pulse  Resp  BP   18 1700  --  --   210/89   18 1600  65  18   193/91   18 1545  --  --   196/85   18 1435   54  18   198/85   18 1330  79   30   232/163     Abnormal Labs/Diagnostic Test Results:   Blood, UA Trace-Intact     Nitrite, UA Positive     WBC, UA 0-1     Bacteria, UA Moderate       CXR:  Emphysema  No acute cardiopulmonary disease  CTA head and neck w wo contrast   No acute intracranial disease, diffuse generalized volume loss, chronic small vessel disease  No large vessel flow restrictive disease within the head or neck  EK beats per minute normal sinus rhythm with PVCs and PACs    She has a bifascicular block    ED Treatment:   Medication Administration from 08/12/2018 1216 to 08/12/2018 1826       Date/Time Order Dose Route Action     08/12/2018 1411 amLODIPine (NORVASC) tablet 5 mg 5 mg Oral Given     08/12/2018 1411 lisinopril (ZESTRIL) tablet 20 mg 20 mg Oral Given     08/12/2018 1359 iodixanol (VISIPAQUE) 320 MG/ML injection 100 mL 100 mL Intravenous Given     08/12/2018 1532 hydrALAZINE (APRESOLINE) injection 5 mg 5 mg Intravenous Given     08/12/2018 1657 hydrALAZINE (APRESOLINE) injection 5 mg 5 mg Intravenous Given          Past Medical/Surgical History: Active Ambulatory Problems     Diagnosis Date Noted    [facility]    Strain of thoracic region 07/26/2018     Resolved Ambulatory Problems     Diagnosis Date Noted    No Resolved Ambulatory Problems     Past Medical History:   Diagnosis Date    Arthritis     BPV (benign positional vertigo)     Gout     High cholesterol     Hypertension     Lumbar spinal stenosis     Parkinsons disease (HCC)     Strain of thoracic region     Tremor        Admitting Diagnosis: Hypertension [I10]  Hypertensive urgency [I16 0]    Age/Sex: 80 y o  female    Assessment/Plan:   Hypertensive urgency   Assessment & Plan     Present on admission, patient reports 2 days slight headache and feeling of confusion with some visual changes  Patient reports that she thought that it was her blood pressure  She took her pressure this morning and found to be 190  Patient reports that she recently was discontinued off of Norvasc because of dizziness  She was diagnosed with BVP  · Resume Norvasc 5 mg  · Resume lisinopril 10 mg  · Hydralazine 10 mg IV Q 6 p r n  for elevated blood pressures greater than 180           PD (Parkinson's disease) ([facility] )   Assessment & Plan     Patient utilizes amantadine and pramipexole per her neurology plan of care   She sees Dr Kathy Mccann    5 will continue her home medication doses          Strain of thoracic region   Assessment & Plan     Patient wears a lidocaine patch will continue           Gout   Assessment & Plan     Currently not an active flare  Continue allopurinol          High cholesterol   Assessment & Plan     Patient reports she is no longer on cholesterol medication  Will continue her fish oil only           BPV (benign positional vertigo)   Assessment & Plan     Currently stable  Will use meclizine p r n            Obesity:  Will request nutritional counseling  VTE Prophylaxis: Heparin  / sequential compression device     Anticipated Length of Stay:  Patient will be admitted on an Observation basis with an anticipated length of stay of   less thanlength of stay of 2 midnights     Justification for Hospital Stay:  Titration of medication and observation for further symptoms     Admission Orders:  Telemetry  OOB as caprice  Daily weights, I/O  XR right shoulder  Consult cardiology  Sequential compression device    Scheduled Meds:   Current Facility-Administered Medications:  acetaminophen 650 mg Oral Q6H PRN   allopurinol 100 mg Oral Daily   amantadine 100 mg Oral BID   aspirin 81 mg Oral Daily   calcium carbonate-vitamin D 1 tablet Oral BID   fish oil 1,000 mg Oral BID   heparin (porcine) 5,000 Units Subcutaneous Q8H [facility]   hydrALAZINE 10 mg Intravenous Q6H PRN   lidocaine 1 patch Transdermal Daily   lisinopril 20 mg Oral Daily   meclizine 12 5 mg Oral Q12H PRN   multivitamin-minerals 1 tablet Oral Daily   pramipexole 0 25 mg Oral TID     Continuous Infusions:    PRN Meds:   acetaminophen    hydrALAZINE    meclizine

## 2018-08-13 NOTE — ASSESSMENT & PLAN NOTE
Present on admission and through yesterday evening  Patient reports 2 days slight headache and feeling of confusion with some visual changes  She reports she is feeling better this am   Patient reports that she recently was discontinued off of Norvasc because of dizziness  · Norvasc 5 mg and Lisinopril 10 mg resumed  · Hydralazine 10 mg IV Q 6 p r n  for elevated blood pressures greater than 180  · Follow up Echo  · Follow up cardiology consultation  · Monitor BP and telemetry

## 2018-08-13 NOTE — ASSESSMENT & PLAN NOTE
Present on admission, patient reports 2 days slight headache and feeling of confusion with some visual changes  Patient reports that she thought that it was her blood pressure  She took her pressure this morning and found to be 190  Patient reports that she recently was discontinued off of Norvasc because of dizziness  She was diagnosed with BVP  · Resume Norvasc 5 mg  · Resume lisinopril 10 mg  · Hydralazine 10 mg IV Q 6 p r n  for elevated blood pressures greater than 180

## 2018-08-13 NOTE — SOCIAL WORK
CM met with patient at bedside  Patient was informed of the OBS status and given a copy of OBS notice  Signed OBS notice is in the chart  Nurse and SLIM notified

## 2018-08-13 NOTE — PROGRESS NOTES
Progress Note - Nicole Higgins 5/8/1930, 80 y o  female MRN: [id number]    Unit/Bed#: -01 Encounter: [id number]    Primary Care Provider: Dion Neri DO   Date and time admitted to hospital: 8/12/2018 12:23 PM        * Hypertensive urgency   Assessment & Plan    Present on admission and through yesterday evening  Patient reports 2 days slight headache and feeling of confusion with some visual changes  She reports she is feeling better this am   Patient reports that she recently was discontinued off of Norvasc because of dizziness  · Norvasc 5 mg and Lisinopril 10 mg resumed  · Hydralazine 10 mg IV Q 6 p r n  for elevated blood pressures greater than 180  · Follow up Echo  · Follow up cardiology consultation  · Monitor BP and telemetry  Hypokalemia   Assessment & Plan    Replace  Gout   Assessment & Plan    · Currently not an active flare  Continue Allopurinol  BPV (benign positional vertigo)   Assessment & Plan    · Currently stable  Will use meclizine p r n  High cholesterol   Assessment & Plan    · Patient reports she is no longer on cholesterol medication  Will continue her fish oil only  PD (Parkinson's disease) [facility])   Assessment & Plan    · Patient utilizes amantadine and pramipexole per her neurology plan of care   · She sees Dr Glenn Obregon  Will continue her home medication doses  VTE Pharmacologic Prophylaxis:   Pharmacologic: Heparin  Mechanical VTE Prophylaxis in Place: Yes    Patient Centered Rounds: I have performed bedside rounds with nursing staff today  Discussions with Specialists or Other Care Team Provider: Awaiting cardiology consult  Education and Discussions with Family / Patient: Discussed with patient- awaiting cardiology recommendations  Time Spent for Care: 45 minutes  More than 50% of total time spent on counseling and coordination of care as described above      Current Length of Stay: 0 day(s)    Current Patient Status: Inpatient   Certification Statement: The patient will continue to require additional inpatient hospital stay due to management of hypertensive urgency and medical conditions  Plan for echo and cardiology consult  Discharge Plan: Awaiting stabilization of medical condition  Code Status: Level 3 - DNAR and DNI      Subjective:   Patient reports she feels better today  No HA  No SOB or CP  No abdominal pain  No nausea  Objective:     Vitals:   Temp (24hrs), Av 8 °F (36 6 °C), Min:97 4 °F (36 3 °C), Max:98 2 °F (36 8 °C)    HR:  [54-80] 80  Resp:  [18-30] 18  BP: (135-238)/() 145/65  SpO2:  [94 %-97 %] 95 %  Body mass index is 33 37 kg/m²  Input and Output Summary (last 24 hours): Intake/Output Summary (Last 24 hours) at 18 1113  Last data filed at 18 0845   Gross per 24 hour   Intake              240 ml   Output                0 ml   Net              240 ml       Physical Exam:     Physical Exam   Constitutional: She is oriented to person, place, and time  She appears well-developed and well-nourished  HENT:   Head: Normocephalic and atraumatic  Eyes: No scleral icterus  Neck: Neck supple  Cardiovascular: Normal rate, regular rhythm and normal heart sounds  Pulmonary/Chest: Effort normal and breath sounds normal  No respiratory distress  She has no wheezes  Abdominal: Soft  Bowel sounds are normal  There is no tenderness  Musculoskeletal: She exhibits no edema  Neurological: She is alert and oriented to person, place, and time  Skin: Skin is warm and dry  Vitals reviewed           Additional Data:     Labs:      Results from last 7 days  Lab Units 18  0520   WBC Thousand/uL 9 52   HEMOGLOBIN g/dL 12 0   HEMATOCRIT % 36 3   PLATELETS Thousands/uL 200   NEUTROS PCT % 84*   LYMPHS PCT % 6*   MONOS PCT % 8   EOS PCT % 1       Results from last 7 days  Lab Units 18  0520 18  1302   SODIUM mmol/L 141 141   POTASSIUM mmol/L 3 1* 3 6 CHLORIDE mmol/L 105 106   CO2 mmol/L 24 25   BUN mg/dL 22 24   CREATININE mg/dL 1 09 1 13   CALCIUM mg/dL 9 0 9 1   TOTAL PROTEIN g/dL  --  7 0   BILIRUBIN TOTAL mg/dL  --  0 50   ALK PHOS U/L  --  97   ALT U/L  --  16   AST U/L  --  15   GLUCOSE RANDOM mg/dL 100 92           * I Have Reviewed All Lab Data Listed Above  * Additional Pertinent Lab Tests Reviewed: [facility] Admission Reviewed    Imaging:    Imaging Reports Reviewed Today Include:  CTA Head: No acute intracranial disease, diffuse generalized volume loss, chronic small vessel disease  CXR: Emphysema  No acute cardiopulmonary disease      No large vessel flow restrictive disease within the head or neck  Recent Cultures (last 7 days):           Last 24 Hours Medication List:     Current Facility-Administered Medications:  acetaminophen 650 mg Oral Q6H PRN Paty Self, MD   allopurinol 100 mg Oral Daily Paty Self, MD   amantadine 100 mg Oral BID Paty Self, MD   aspirin 81 mg Oral Daily Paty Self, MD   calcium carbonate-vitamin D 1 tablet Oral BID Paty Self, MD   fish oil 1,000 mg Oral BID Paty Self, MD   heparin (porcine) 5,000 Units Subcutaneous Q8H [address] Paty Dickerson, MD   hydrALAZINE 10 mg Intravenous Q6H PRN Paty Self, MD   lidocaine 1 patch Transdermal Daily Paty Self, MD   lisinopril 20 mg Oral Daily Paty Self, MD   meclizine 12 5 mg Oral Q12H PRN Paty Self, MD   multivitamin-minerals 1 tablet Oral Daily Paty Self, MD   pramipexole 0 25 mg Oral TID Paty Dickerson, MD        Today, Patient Was Seen By: Bill Ramsay PA-C    ** Please Note: Dictation voice to text software may have been used in the creation of this document   **

## 2018-08-14 VITALS
DIASTOLIC BLOOD PRESSURE: 76 MMHG | HEART RATE: 61 BPM | HEIGHT: 61 IN | BODY MASS INDEX: 33.07 KG/M2 | OXYGEN SATURATION: 95 % | TEMPERATURE: 97.9 F | WEIGHT: 175.13 LBS | SYSTOLIC BLOOD PRESSURE: 167 MMHG | RESPIRATION RATE: 18 BRPM

## 2018-08-14 PROBLEM — E87.6 HYPOKALEMIA: Status: RESOLVED | Noted: 2018-08-13 | Resolved: 2018-08-14

## 2018-08-14 LAB
ANION GAP SERPL CALCULATED.3IONS-SCNC: 9 MMOL/L (ref 4–13)
BASOPHILS # BLD AUTO: 0.03 THOUSANDS/ΜL (ref 0–0.1)
BASOPHILS NFR BLD AUTO: 1 % (ref 0–1)
BUN SERPL-MCNC: 27 MG/DL (ref 5–25)
CALCIUM SERPL-MCNC: 8.9 MG/DL (ref 8.3–10.1)
CHLORIDE SERPL-SCNC: 108 MMOL/L (ref 100–108)
CO2 SERPL-SCNC: 24 MMOL/L (ref 21–32)
CREAT SERPL-MCNC: 1.3 MG/DL (ref 0.6–1.3)
EOSINOPHIL # BLD AUTO: 0.23 THOUSAND/ΜL (ref 0–0.61)
EOSINOPHIL NFR BLD AUTO: 4 % (ref 0–6)
ERYTHROCYTE [DISTWIDTH] IN BLOOD BY AUTOMATED COUNT: 14 % (ref 11.6–15.1)
GFR SERPL CREATININE-BSD FRML MDRD: 37 ML/MIN/1.73SQ M
GLUCOSE SERPL-MCNC: 109 MG/DL (ref 65–140)
HCT VFR BLD AUTO: 34.2 % (ref 34.8–46.1)
HGB BLD-MCNC: 11.3 G/DL (ref 11.5–15.4)
IMM GRANULOCYTES # BLD AUTO: 0.01 THOUSAND/UL (ref 0–0.2)
IMM GRANULOCYTES NFR BLD AUTO: 0 % (ref 0–2)
LYMPHOCYTES # BLD AUTO: 0.84 THOUSANDS/ΜL (ref 0.6–4.47)
LYMPHOCYTES NFR BLD AUTO: 13 % (ref 14–44)
MCH RBC QN AUTO: 31.3 PG (ref 26.8–34.3)
MCHC RBC AUTO-ENTMCNC: 33 G/DL (ref 31.4–37.4)
MCV RBC AUTO: 95 FL (ref 82–98)
MONOCYTES # BLD AUTO: 0.59 THOUSAND/ΜL (ref 0.17–1.22)
MONOCYTES NFR BLD AUTO: 9 % (ref 4–12)
NEUTROPHILS # BLD AUTO: 4.66 THOUSANDS/ΜL (ref 1.85–7.62)
NEUTS SEG NFR BLD AUTO: 73 % (ref 43–75)
NRBC BLD AUTO-RTO: 0 /100 WBCS
PLATELET # BLD AUTO: 187 THOUSANDS/UL (ref 149–390)
PMV BLD AUTO: 9.6 FL (ref 8.9–12.7)
POTASSIUM SERPL-SCNC: 3.8 MMOL/L (ref 3.5–5.3)
RBC # BLD AUTO: 3.61 MILLION/UL (ref 3.81–5.12)
SODIUM SERPL-SCNC: 141 MMOL/L (ref 136–145)
WBC # BLD AUTO: 6.36 THOUSAND/UL (ref 4.31–10.16)

## 2018-08-14 PROCEDURE — 99239 HOSP IP/OBS DSCHRG MGMT >30: CPT | Performed by: PHYSICIAN ASSISTANT

## 2018-08-14 PROCEDURE — 85025 COMPLETE CBC W/AUTO DIFF WBC: CPT | Performed by: PHYSICIAN ASSISTANT

## 2018-08-14 PROCEDURE — 80048 BASIC METABOLIC PNL TOTAL CA: CPT | Performed by: PHYSICIAN ASSISTANT

## 2018-08-14 RX ORDER — AMLODIPINE BESYLATE 2.5 MG/1
2.5 TABLET ORAL DAILY
Status: DISCONTINUED | OUTPATIENT
Start: 2018-08-14 | End: 2018-08-14 | Stop reason: HOSPADM

## 2018-08-14 RX ORDER — AMLODIPINE BESYLATE 2.5 MG/1
2.5 TABLET ORAL DAILY
Qty: 30 TABLET | Refills: 0 | Status: SHIPPED | OUTPATIENT
Start: 2018-08-14 | End: 2022-01-04 | Stop reason: ALTCHOICE

## 2018-08-14 RX ADMIN — CALCIUM CARBONATE-VITAMIN D TAB 500 MG-200 UNIT 1 TABLET: 500-200 TAB at 09:32

## 2018-08-14 RX ADMIN — Medication 1000 MG: at 09:32

## 2018-08-14 RX ADMIN — HEPARIN SODIUM 5000 UNITS: 5000 INJECTION, SOLUTION INTRAVENOUS; SUBCUTANEOUS at 06:15

## 2018-08-14 RX ADMIN — Medication 1 TABLET: at 09:31

## 2018-08-14 RX ADMIN — AMLODIPINE BESYLATE 2.5 MG: 2.5 TABLET ORAL at 11:43

## 2018-08-14 RX ADMIN — ALLOPURINOL 100 MG: 100 TABLET ORAL at 09:32

## 2018-08-14 RX ADMIN — PRAMIPEXOLE DIHYDROCHLORIDE 0.25 MG: 0.25 TABLET ORAL at 09:32

## 2018-08-14 RX ADMIN — LISINOPRIL 20 MG: 20 TABLET ORAL at 09:32

## 2018-08-14 RX ADMIN — AMANTADINE HYDROCHLORIDE 100 MG: 100 CAPSULE ORAL at 11:43

## 2018-08-14 RX ADMIN — ASPIRIN 81 MG 81 MG: 81 TABLET ORAL at 09:32

## 2018-08-14 RX ADMIN — LIDOCAINE 1 PATCH: 50 PATCH TOPICAL at 09:32

## 2018-08-14 NOTE — DISCHARGE SUMMARY
Discharge- Deneen Gilbert 5/8/1930, 80 y o  female MRN: [id number]    Unit/Bed#: -01 Encounter: [id number]    Primary Care Provider: Josiah Julien DO   Date and time admitted to hospital: 8/12/2018 12:23 PM        * Hypertensive urgency   Assessment & Plan    Present on admission, suspect secondary to discontinuation of amlodipine  Patient reports symptomatic resolution, no headache no vision changes  She reports prior to admission she just felt off, similar to 1 year ago when she was admitted to [facility] for similar issue  Patient is stable for discharge, blood pressure is now better, continue lisinopril 20 mg daily as per home dose  Restarted Norvasc at lower dose 2 5 mg daily  Can titrate up to 5 mg if blood pressure consistently elevated > 150/90  Per her son-in-law, they are leaving for vacation soon, however a family friend who is a registered nurse will be staying with the patient  She will be checking her blood pressure  Echocardiogram unremarkable, no further workup needed this hospitalization  She already has an appointment tomorrow with her PCP for blood pressure check  PD (Parkinson's disease) [facility])   Assessment & Plan    Patient utilizes amantadine and pramipexole per her neurology plan of care  Follow-up with primary neurologist outpatient as previously planned        Hypokalemia-resolved as of 8/14/2018   Assessment & Plan    Replaced, normalized        High cholesterol   Assessment & Plan    Patient reports she is no longer on cholesterol medication, secondary to a age agree with continuing fish oil        Gout   Assessment & Plan    Currently not an active flare  Continue Allopurinol  BPV (benign positional vertigo)   Assessment & Plan    Continue meclizine as needed outpatient        Strain of thoracic region   Assessment & Plan    Patient wears a lidocaine patch will continue            Discharging Physician / Practitioner: Earlene Chapman, CHARLA  PCP: Brice Victor DO  Admission Date:   Admission Orders     Ordered        08/13/18 1107  Inpatient Admission  Once         08/12/18 1633  Place in Observation (expected length of stay for this patient is less than two midnights)  Once             Discharge Date: 08/14/18    Resolved Problems  Date Reviewed: 8/14/2018          Resolved    Hypokalemia 8/14/2018     Resolved by  Anne-Marie Vargas PA-C          Consultations During Hospital Stay:  · Cardiology     Procedures Performed:     · CTA head/neck - no acute findings   · CXR - no acute findings  · Shoulder XR - no acute findings     Significant Findings / Test Results:     · Hypokalemia, resolved 3 8    Incidental Findings:   · None     Test Results Pending at Discharge (will require follow up): · None     Outpatient Tests Requested:  · Follow-up PCP already set up for tomorrow, 1/48    Complications:  None    Reason for Admission:  Hypertensive urgency    Hospital Course:     Meeta Larson is a 80 y o  female patient who originally presented to the hospital on 8/12/2018 due to headache and confusion  Symptoms started 2 days prior to presentation  She has a history of hypertension was recently taken off of her blood pressure medication because of vertigo symptoms  Blood pressure is found to be elevated 439-635 systolic  Patient admitted for further titration of blood pressure medications and monitoring  CTA of the head neck showed no acute findings  Blood pressure medications were resumed with improvement of blood pressure  She is not having any increasing vertigo symptoms  Patient reports complete resolution of symptoms with improvement of blood pressure, she feels comfortable discharged home  She understands follow up with PCP as well as her neurologist for the Parkinson's  Patient also complaining of shoulder pain, per her son this is chronic  No acute intervention required at this time given normal x-ray findings    She can follow up outpatient with Orthopedics  Please see above list of diagnoses and related plan for additional information  Condition at Discharge: good     Discharge Day Visit / Exam:     Subjective:  Patient seen and examined, feeling well  Her main concern is the shoulder x-ray, she wants to know what it showed  Denies any headache, visual changes, dizziness or syncopal/presyncopal episodes  Vitals: Blood Pressure: 167/75 (08/14/18 0700)  Pulse: 68 (08/14/18 0700)  Temperature: 98 °F (36 7 °C) (08/14/18 0700)  Temp Source: Oral (08/14/18 0700)  Respirations: 18 (08/14/18 0700)  Height: 5' 1" (154 9 cm) (08/12/18 1829)  Weight - Scale: 79 4 kg (175 lb 2 oz) (08/14/18 0600)  SpO2: 95 % (08/14/18 0700)  Exam:   Physical Exam   Constitutional: She is oriented to person, place, and time  She appears well-developed and well-nourished  No distress  Cardiovascular: Normal rate, regular rhythm, S1 normal, S2 normal, normal heart sounds and intact distal pulses  No murmur heard  Pulmonary/Chest: Effort normal and breath sounds normal  No respiratory distress  She has no wheezes  She has no rales  Abdominal: Soft  Bowel sounds are normal  She exhibits no distension  There is no tenderness  Musculoskeletal: She exhibits no edema  Right shoulder with no over deformity on examination/palpation   strength normal   Neurological: She is alert and oriented to person, place, and time  No cranial nerve deficit  Psychiatric: She has a normal mood and affect  Nursing note and vitals reviewed  Discussion with Family:  Discussed with the patient's son-in-law by phone, who was agreeable on discharge plan  All questions/concerns answered to the best my ability at time of discharge to satisfaction    Discharge instructions/Information to patient and family:   See after visit summary for information provided to patient and family        Provisions for Follow-Up Care:  See after visit summary for information related to follow-up care and any pertinent home health orders  Disposition:     Home    Planned Readmission:  None     Discharge Statement:  I spent approximately 40 minutes discharging the patient  This time was spent on the day of discharge  I had direct contact with the patient on the day of discharge  Greater than 50% of the total time was spent examining patient, answering all patient questions, arranging and discussing plan of care with patient as well as directly providing post-discharge instructions  Additional time then spent on discharge activities  Discharge Medications:  See after visit summary for reconciled discharge medications provided to patient and family        ** Please Note: This note has been constructed using a voice recognition system **

## 2018-08-14 NOTE — ASSESSMENT & PLAN NOTE
Present on admission, suspect secondary to discontinuation of amlodipine  Patient reports symptomatic resolution, no headache no vision changes  She reports prior to admission she just felt off, similar to 1 year ago when she was admitted to [facility] for similar issue  Patient is stable for discharge, blood pressure is now better, continue lisinopril 20 mg daily as per home dose  Restarted Norvasc at lower dose 2 5 mg daily  Can titrate up to 5 mg if blood pressure consistently elevated > 150/90  Per her son-in-law, they are leaving for vacation soon, however a family friend who is a registered nurse will be staying with the patient  She will be checking her blood pressure  Echocardiogram unremarkable, no further workup needed this hospitalization  She already has an appointment tomorrow with her PCP for blood pressure check

## 2018-08-14 NOTE — CASE MANAGEMENT
OBS order 8/12   1633  Converted IP 8/13  1107     Admitting Physician ASHLEY WRIGHT    Level of Care Med Surg    Estimated length of stay More than 2 Midnights    Certification I certify that inpatient services are medically necessary for this patient for a duration of greater than two midnights   See H&P and MD Progress Notes for additional information about the patient's course of treatment

## 2018-08-14 NOTE — PLAN OF CARE
CARDIOVASCULAR - ADULT     Maintains optimal cardiac output and hemodynamic stability Progressing     Absence of cardiac dysrhythmias or at baseline rhythm Progressing        METABOLIC, FLUID AND ELECTROLYTES - ADULT     Electrolytes maintained within normal limits Progressing     Fluid balance maintained Progressing     Glucose maintained within target range Progressing        MUSCULOSKELETAL - ADULT     Maintain or return mobility to safest level of function Progressing     Maintain proper alignment of affected body part Progressing        Potential for Falls     Patient will remain free of falls Progressing        Prexisting or High Potential for Compromised Skin Integrity     Skin integrity is maintained or improved Progressing        SAFETY ADULT     Maintain or return to baseline ADL function Progressing     Maintain or return mobility status to optimal level Progressing        SKIN/TISSUE INTEGRITY - ADULT     Skin integrity remains intact Progressing     Incision(s), wounds(s) or drain site(s) healing without S/S of infection Progressing     Oral mucous membranes remain intact Progressing          CARDIOVASCULAR - ADULT     Maintains optimal cardiac output and hemodynamic stability Progressing     Absence of cardiac dysrhythmias or at baseline rhythm Progressing        METABOLIC, FLUID AND ELECTROLYTES - ADULT     Electrolytes maintained within normal limits Progressing     Fluid balance maintained Progressing     Glucose maintained within target range Progressing        MUSCULOSKELETAL - ADULT     Maintain or return mobility to safest level of function Progressing     Maintain proper alignment of affected body part Progressing        Potential for Falls     Patient will remain free of falls Progressing        Prexisting or High Potential for Compromised Skin Integrity     Skin integrity is maintained or improved Progressing        SAFETY ADULT     Maintain or return to baseline ADL function Progressing     Maintain or return mobility status to optimal level Progressing        SKIN/TISSUE INTEGRITY - ADULT     Skin integrity remains intact Progressing     Incision(s), wounds(s) or drain site(s) healing without S/S of infection Progressing     Oral mucous membranes remain intact Progressing          CARDIOVASCULAR - ADULT     Maintains optimal cardiac output and hemodynamic stability Progressing     Absence of cardiac dysrhythmias or at baseline rhythm Progressing        METABOLIC, FLUID AND ELECTROLYTES - ADULT     Electrolytes maintained within normal limits Progressing     Fluid balance maintained Progressing     Glucose maintained within target range Progressing        MUSCULOSKELETAL - ADULT     Maintain or return mobility to safest level of function Progressing     Maintain proper alignment of affected body part Progressing        Potential for Falls     Patient will remain free of falls Progressing        Prexisting or High Potential for Compromised Skin Integrity     Skin integrity is maintained or improved Progressing        SAFETY ADULT     Maintain or return to baseline ADL function Progressing     Maintain or return mobility status to optimal level Progressing        SKIN/TISSUE INTEGRITY - ADULT     Skin integrity remains intact Progressing     Incision(s), wounds(s) or drain site(s) healing without S/S of infection Progressing     Oral mucous membranes remain intact Progressing

## 2018-08-14 NOTE — PLAN OF CARE
CARDIOVASCULAR - ADULT     Maintains optimal cardiac output and hemodynamic stability Progressing     Absence of cardiac dysrhythmias or at baseline rhythm Progressing        METABOLIC, FLUID AND ELECTROLYTES - ADULT     Electrolytes maintained within normal limits Progressing     Fluid balance maintained Progressing     Glucose maintained within target range Progressing        MUSCULOSKELETAL - ADULT     Maintain or return mobility to safest level of function Progressing     Maintain proper alignment of affected body part Progressing        Potential for Falls     Patient will remain free of falls Progressing        Prexisting or High Potential for Compromised Skin Integrity     Skin integrity is maintained or improved Progressing        SAFETY ADULT     Maintain or return to baseline ADL function Progressing     Maintain or return mobility status to optimal level Progressing        SKIN/TISSUE INTEGRITY - ADULT     Skin integrity remains intact Progressing     Incision(s), wounds(s) or drain site(s) healing without S/S of infection Progressing     Oral mucous membranes remain intact Progressing

## 2018-08-14 NOTE — ASSESSMENT & PLAN NOTE
Patient reports she is no longer on cholesterol medication, secondary to a age agree with continuing fish oil

## 2018-08-14 NOTE — ASSESSMENT & PLAN NOTE
Patient utilizes amantadine and pramipexole per her neurology plan of care    Follow-up with primary neurologist outpatient as previously planned

## 2018-08-14 NOTE — PHYSICIAN ADVISOR
Current patient class: Inpatient  The patient is currently on Hospital Day: 3 at [address]      The patient was admitted to the hospital at 1107 on 8/13/18 for the following diagnosis:  Hypertension [I10]  Hypertensive urgency [I16 0]       There is documentation in the medical record of an expected length of stay of at least 2 midnights  The patient is therefore expected to satisfy the 2 midnight benchmark and given the 2 midnight presumption is appropriate for INPATIENT ADMISSION  Given this expectation of a satisfying stay, CMS instructs us that the patient is most often appropriate for inpatient admission under part A provided medical necessity is documented in the chart  After review of the relevant documentation, labs, vital signs and test results, the patient is appropriate for INPATIENT ADMISSION  Admission to the hospital as an inpatient is a complex decision making process which requires the practitioner to consider the patients presenting complaint, history and physical examination and all relevant testing  With this in mind, in this case, the patient was deemed appropriate for INPATIENT ADMISSION  After review of the documentation and testing available at the time of the admission I concur with this clinical determination of medical necessity  Rationale is as follows: The patient is a 80 yrs old Female who presented to the ED at 8/12/2018 12:23 PM with a chief complaint of High Blood Pressure (Pt presents with elevated BP at home of 195/115  Pt also c/o increased blurred vision and feeling "unstable and mild vertigo "  Pt denies CP or headache  Pt states she does have a hx of Parkinson's but this "feels different "  Pt did not take BP meds at home today )    Given the need for further hospitalization, and along with the documentation of medical necessity present in the chart, the patient is appropriate for inpatient admission    The patient is expected to satisfy the 2 midnight benchmark, and will require further acute medical care  The patient does have comorbid conditions which increases the risk for significant adverse outcome  Given this the patient is appropriate for inpatient admission        The patients vitals on arrival were ED Triage Vitals [08/12/18 1231]   Temperature Pulse Respirations Blood Pressure SpO2   (!) 97 4 °F (36 3 °C) 64 18 (!) 238/102 97 %      Temp Source Heart Rate Source Patient Position - Orthostatic VS BP Location FiO2 (%)   Oral Monitor Sitting Right arm --      Pain Score       No Pain           Past Medical History:   Diagnosis Date    Arthritis     BPV (benign positional vertigo)     Gout     High cholesterol     Hypertension     Lumbar spinal stenosis     Parkinsons disease (HCC)     Strain of thoracic region     Tremor      Past Surgical History:   Procedure Laterality Date    APPENDECTOMY      CATARACT EXTRACTION      REPLACEMENT TOTAL KNEE BILATERAL      TONSILLECTOMY             Consults have been placed to:   IP CONSULT TO CARDIOLOGY    Vitals:    08/13/18 0700 08/13/18 1100 08/13/18 1500 08/13/18 2300   BP: 145/65 160/73 125/59 137/63   BP Location: Left arm Right arm  Right arm   Pulse: 80 57 66 66   Resp: 18 18 20 18   Temp: 97 9 °F (36 6 °C) 98 3 °F (36 8 °C) 98 1 °F (36 7 °C) 98 5 °F (36 9 °C)   TempSrc: Oral Oral Oral Oral   SpO2: 95% 95% 97% 96%   Weight:       Height:           Most recent labs:    Recent Labs      08/12/18   1302   08/12/18   1657  08/13/18   0520  08/13/18   1438   WBC   --    < >   --   9 52   --    HGB   --    < >   --   12 0   --    HCT   --    < >   --   36 3   --    PLT   --    < >   --   200  161   K  3 6   --    --   3 1*   --    NA  141   --    --   141   --    CALCIUM  9 1   --    --   9 0   --    BUN  24   --    --   22   --    CREATININE  1 13   --    --   1 09   --    TROPONINI  <0 02   --   0 02   --    --    AST  15   --    --    --    --    ALT  16   --    --    --    -- ALKPHOS  97   --    --    --    --    BILITOT  0 50   --    --    --    --     < > = values in this interval not displayed         Scheduled Meds:  Current Facility-Administered Medications:  acetaminophen 650 mg Oral Q6H PRN Samuel Gibson MD   allopurinol 100 mg Oral Daily Samuel Gibson MD   amantadine 100 mg Oral BID Samuel Gibson MD   aspirin 81 mg Oral Daily Samuel Gibson MD   calcium carbonate-vitamin D 1 tablet Oral BID Saumel Gibson MD   fish oil 1,000 mg Oral BID Samuel Gibson MD   heparin (porcine) 5,000 Units Subcutaneous Q8H [facility] Samuel Gibson MD   hydrALAZINE 10 mg Intravenous Q6H PRN Samuel Gibson MD   lidocaine 1 patch Transdermal Daily Samuel Gibson MD   lisinopril 20 mg Oral Daily Samuel Gibson MD   meclizine 12 5 mg Oral Q12H PRN Samuel Gibson MD   multivitamin-minerals 1 tablet Oral Daily Samuel Gibson MD   pramipexole 0 25 mg Oral TID Samuel Gibson MD     Continuous Infusions:   PRN Meds:   acetaminophen    hydrALAZINE    meclizine    Surgical procedures (if appropriate):

## 2018-08-17 DIAGNOSIS — G20 PARKINSON'S DISEASE (HCC): ICD-10-CM

## 2018-08-17 RX ORDER — PRAMIPEXOLE DIHYDROCHLORIDE 0.12 MG/1
TABLET ORAL
Qty: 90 TABLET | Refills: 0 | OUTPATIENT
Start: 2018-08-17

## 2018-08-22 DIAGNOSIS — G20 PARKINSON'S DISEASE (HCC): ICD-10-CM

## 2018-08-22 RX ORDER — PRAMIPEXOLE DIHYDROCHLORIDE 0.12 MG/1
TABLET ORAL
Qty: 90 TABLET | Refills: 0 | Status: SHIPPED | OUTPATIENT
Start: 2018-08-22 | End: 2018-11-27 | Stop reason: ALTCHOICE

## 2018-09-29 DIAGNOSIS — G20 PARKINSON'S DISEASE (HCC): ICD-10-CM

## 2018-10-01 RX ORDER — PRAMIPEXOLE DIHYDROCHLORIDE 0.12 MG/1
0.25 TABLET ORAL 3 TIMES DAILY
Qty: 90 TABLET | Refills: 6 | Status: SHIPPED | OUTPATIENT
Start: 2018-10-01 | End: 2018-11-27 | Stop reason: SDUPTHER

## 2018-10-01 RX ORDER — PRAMIPEXOLE DIHYDROCHLORIDE 0.12 MG/1
TABLET ORAL
Qty: 90 TABLET | Refills: 0 | OUTPATIENT
Start: 2018-10-01

## 2018-11-27 ENCOUNTER — OFFICE VISIT (OUTPATIENT)
Dept: NEUROLOGY | Facility: CLINIC | Age: 83
End: 2018-11-27
Payer: MEDICARE

## 2018-11-27 VITALS
DIASTOLIC BLOOD PRESSURE: 68 MMHG | WEIGHT: 160 LBS | SYSTOLIC BLOOD PRESSURE: 130 MMHG | HEIGHT: 61 IN | BODY MASS INDEX: 30.21 KG/M2 | HEART RATE: 64 BPM

## 2018-11-27 DIAGNOSIS — S29.019D STRAIN OF THORACIC REGION, SUBSEQUENT ENCOUNTER: ICD-10-CM

## 2018-11-27 DIAGNOSIS — G20 PD (PARKINSON'S DISEASE) (HCC): Primary | ICD-10-CM

## 2018-11-27 DIAGNOSIS — R26.9 NEUROLOGIC GAIT DYSFUNCTION: ICD-10-CM

## 2018-11-27 PROCEDURE — 99214 OFFICE O/P EST MOD 30 MIN: CPT | Performed by: PSYCHIATRY & NEUROLOGY

## 2018-11-27 RX ORDER — PRAMIPEXOLE DIHYDROCHLORIDE 0.12 MG/1
0.25 TABLET ORAL 3 TIMES DAILY
Qty: 90 TABLET | Refills: 3 | Status: SHIPPED | OUTPATIENT
Start: 2018-11-27 | End: 2018-11-27 | Stop reason: ALTCHOICE

## 2018-11-27 NOTE — PROGRESS NOTES
Progress Note - Neurology   Ana Maria Raoy 80 y o  female MRN: [id number]  Unit/Bed#:  Encounter: [id number]      Subjective:   Patient is here for a follow-up visit with a history of Parkinson's disease, gait dysfunction, thoracic strain and since her last visit she was hospitalized in August for hypertensive urgency  During that time the patient was not maintained on Sinemet and was discharged on amantadine and pramipexole  Patient has declined since then and claims she has had 6 falls in the recent past, with no major injuries, tends to fall forwards and now has been ambulating with the help of a walker  She describes worsening slowness and freezing upon ambulation  She also describes midback pain especially if she stands for any prolonged period of time  Denies any other neurological symptoms  During a hospitalization she had a CTA of the head and neck which was unremarkable  ROS:   Review of Systems   Constitutional: Negative for appetite change and fever  HENT: Positive for drooling and voice change  Negative for hearing loss, tinnitus and trouble swallowing  Eyes: Negative  Negative for photophobia and pain  Respiratory: Negative  Negative for shortness of breath  Cardiovascular: Positive for leg swelling  Negative for chest pain and palpitations  Gastrointestinal: Negative  Negative for abdominal pain, nausea and vomiting  Endocrine: Negative  Negative for cold intolerance and heat intolerance  Genitourinary: Positive for enuresis  Negative for dysuria, frequency and urgency  Musculoskeletal: Positive for back pain and gait problem  Negative for myalgias and neck pain  Skin: Negative  Negative for rash  Neurological: Positive for dizziness, tremors and weakness  Negative for seizures, syncope, facial asymmetry, speech difficulty, light-headedness, numbness and headaches  Hematological: Negative  Does not bruise/bleed easily     Psychiatric/Behavioral: Negative for confusion, hallucinations and sleep disturbance  Vitals:    11/27/18 1507   BP: 130/68   BP Location: Left arm   Patient Position: Sitting   Cuff Size: Adult   Pulse: 64   Weight: 72 6 kg (160 lb)   Height: 5' 1" (1 549 m)     MEDS:      Current Outpatient Prescriptions:     allopurinol (ZYLOPRIM) 100 mg tablet, Take 1 tablet by mouth daily, Disp: , Rfl:     amantadine (SYMMETREL) 100 mg capsule, Take 1 capsule (100 mg total) by mouth 2 (two) times a day, Disp: 60 capsule, Rfl: 3    amLODIPine (NORVASC) 2 5 mg tablet, Take 1 tablet (2 5 mg total) by mouth daily Can increase to 5 mg daily if blood pressure consistently >150/90, Disp: 30 tablet, Rfl: 0    aspirin 81 MG tablet, Take 1 tablet by mouth daily, Disp: , Rfl:     Calcium Carb-Cholecalciferol (CALCIUM + D3) 600-200 MG-UNIT TABS, Take 2 tablets by mouth 2 (two) times a day, Disp: , Rfl:     Cholecalciferol (VITAMIN D) 2000 units CAPS, Take 1 capsule by mouth 2 (two) times a day, Disp: , Rfl:     lisinopril (ZESTRIL) 20 mg tablet, Take 20 mg by mouth daily  , Disp: , Rfl:     meclizine (ANTIVERT) 25 mg tablet, Take 1 tablet by mouth 2 (two) times a day as needed, Disp: , Rfl:     Multiple Vitamins-Minerals (CENTRUM SILVER) tablet, Take 1 tablet by mouth daily, Disp: , Rfl:     Omega-3 Fatty Acids (FISH OIL) 1,000 mg, Take 1 capsule by mouth 2 (two) times a day, Disp: , Rfl:     pramipexole (MIRAPEX) 0 125 mg tablet, Take 2 tablets (0 25 mg total) by mouth 3 (three) times a day, Disp: 90 tablet, Rfl: 6  :    Physical Exam:  General appearance: alert, appears stated age and cooperative  Head: Normocephalic, without obvious abnormality, atraumatic    Neurologic:  On examination has evidence of bradykinesia, resting tremor more prominent in the lower extremities than the upper extremities, with mild the cogwheeling rigidity bilaterally    No new focal deficits were noted on motor and sensory exam, no evidence of any dysmetria, she had difficulty standing from the sitting position and ambulates with the help of a walker  Lab Results: I have personally reviewed pertinent reports  Imaging Studies: I have personally reviewed pertinent reports  Assessment:  1  Parkinson's disease with recent worsening  2  Gait dysfunction with recurrent falls  3  Chronic thoracic strain with kyphosis  Plan:  Patient is advised a short course of physical therapy with gait training, will resume Sinemet 25/100, 1 tablets 3 times a day, discontinue amantadine and maintain her on Mirapex 0 25 mg 3 times a day  Patient is advised to return back to see me in 2-3 months and will call us if she notices any side effects or worsening symptoms  11/27/2018,3:16 PM    Dictation voice to text software has been used in the creation of this document  Please consider this in light of any contextual or grammatical errors

## 2018-12-04 RX ORDER — AMANTADINE HYDROCHLORIDE 100 MG/1
100 CAPSULE, GELATIN COATED ORAL 2 TIMES DAILY
Qty: 60 CAPSULE | Refills: 5 | Status: CANCELLED | OUTPATIENT
Start: 2018-12-04

## 2018-12-06 ENCOUNTER — TELEPHONE (OUTPATIENT)
Dept: NEUROLOGY | Facility: CLINIC | Age: 83
End: 2018-12-06

## 2018-12-06 NOTE — TELEPHONE ENCOUNTER
pt's on in law called regarding PT referral   he requested that i fax referral   he did not have fax number  phone number is [phone]  i called and fax number is [phone]    referral faxed

## 2019-02-27 DIAGNOSIS — G20 PD (PARKINSON'S DISEASE) (HCC): Primary | ICD-10-CM

## 2019-02-27 RX ORDER — PRAMIPEXOLE DIHYDROCHLORIDE 0.12 MG/1
TABLET ORAL
Qty: 90 TABLET | Refills: 2 | Status: SHIPPED | OUTPATIENT
Start: 2019-02-27 | End: 2019-04-19 | Stop reason: SDUPTHER

## 2019-03-12 ENCOUNTER — OFFICE VISIT (OUTPATIENT)
Dept: NEUROLOGY | Facility: CLINIC | Age: 84
End: 2019-03-12
Payer: MEDICARE

## 2019-03-12 VITALS
HEART RATE: 60 BPM | WEIGHT: 164 LBS | BODY MASS INDEX: 30.96 KG/M2 | SYSTOLIC BLOOD PRESSURE: 164 MMHG | DIASTOLIC BLOOD PRESSURE: 70 MMHG | HEIGHT: 61 IN

## 2019-03-12 DIAGNOSIS — G20 PD (PARKINSON'S DISEASE) (HCC): ICD-10-CM

## 2019-03-12 PROCEDURE — 99213 OFFICE O/P EST LOW 20 MIN: CPT | Performed by: PSYCHIATRY & NEUROLOGY

## 2019-03-12 NOTE — PROGRESS NOTES
Progress Note - Neurology   Nava Cintron 80 y o  female MRN: [id number]  Unit/Bed#:  Encounter: [id number]      Subjective:   Patient is here for a follow-up visit with a history of Parkinson's disease, gait dysfunction, and since her last visit has had 2-3 falls generally associated with turns  She remains on Sinemet and Mirapex but has been using 1 of those medications only twice a day insert of 3 times a day  Patient ambulates with the help of a walker at home and claims for most of the day her tremor is under control  She does experience tremors at night in the left lower extremity at times waking her up from sleep  She also could not go for physical therapy as per recommendations  ROS:   Review of Systems   Constitutional: Negative  Negative for appetite change, fatigue and fever  HENT: Positive for drooling  Negative for hearing loss, tinnitus, trouble swallowing and voice change  Eyes: Negative  Negative for photophobia, pain and visual disturbance  Respiratory: Positive for shortness of breath  Cardiovascular: Negative  Negative for palpitations  Gastrointestinal: Negative  Negative for constipation, diarrhea, nausea and vomiting  Endocrine: Negative  Negative for cold intolerance and heat intolerance  Genitourinary: Positive for enuresis  Negative for dysuria, frequency and urgency  Musculoskeletal: Positive for back pain and gait problem  Negative for myalgias and neck pain  Skin: Negative  Negative for rash  Neurological: Negative for dizziness, tremors, seizures, syncope, facial asymmetry, speech difficulty, weakness, light-headedness, numbness and headaches  Hematological: Negative  Does not bruise/bleed easily  Psychiatric/Behavioral: Positive for confusion  Negative for hallucinations and sleep disturbance         Vitals:   Vitals:    03/12/19 1609 03/12/19 1615   BP: 170/80 164/70   BP Location: Left arm Right arm   Patient Position: Sitting Sitting   Cuff Size: Adult Adult   Pulse: 60    Weight: 74 4 kg (164 lb)    Height: 5' 1" (1 549 m)    ,Body mass index is 30 99 kg/m²  MEDS:      Current Outpatient Medications:     allopurinol (ZYLOPRIM) 100 mg tablet, Take 1 tablet by mouth daily, Disp: , Rfl:     amLODIPine (NORVASC) 2 5 mg tablet, Take 1 tablet (2 5 mg total) by mouth daily Can increase to 5 mg daily if blood pressure consistently >150/90, Disp: 30 tablet, Rfl: 0    aspirin 81 MG tablet, Take 1 tablet by mouth daily, Disp: , Rfl:     Calcium Carb-Cholecalciferol (CALCIUM + D3) 600-200 MG-UNIT TABS, Take 2 tablets by mouth 2 (two) times a day, Disp: , Rfl:     carbidopa-levodopa (SINEMET)  mg per tablet, Take 1 tablet by mouth 3 (three) times a day, Disp: 90 tablet, Rfl: 3    Cholecalciferol (VITAMIN D) 2000 units CAPS, Take 1 capsule by mouth 2 (two) times a day, Disp: , Rfl:     lisinopril (ZESTRIL) 20 mg tablet, Take 20 mg by mouth daily  , Disp: , Rfl:     pramipexole (MIRAPEX) 0 125 mg tablet, Take 2 tablets by mouth 3 times a day, Disp: 90 tablet, Rfl: 2    Multiple Vitamins-Minerals (CENTRUM SILVER) tablet, Take 1 tablet by mouth daily, Disp: , Rfl:   :    Physical Exam:  General appearance: alert, appears stated age and cooperative  Head: Normocephalic, without obvious abnormality, atraumatic    On examination patient has drooling, postural instability, with evidence of a resting tremor noted in the left lower extremity and no significant cogwheeling rigidity was noted but she has mild bradykinesia  There is no evidence of any new cranial nerve, motor or sensory deficits in the upper or lower extremity, no evidence of any dysmetria and has mild shuffling gait  Lab Results: I have personally reviewed pertinent reports  Imaging Studies: I have personally reviewed pertinent reports  Assessment:  1  Parkinson's disease with gait dysfunction  Plan:   At this time patient is advised to use Sinemet and Mirapex 3 times a day instead of twice a day, home exercise program is encouraged and patient will return back to see me in 2-3 months  3/12/2019,4:15 PM    Dictation voice to text software has been used in the creation of this document  Please consider this in light of any contextual or grammatical errors

## 2019-04-19 DIAGNOSIS — G20 PD (PARKINSON'S DISEASE) (HCC): ICD-10-CM

## 2019-04-19 RX ORDER — PRAMIPEXOLE DIHYDROCHLORIDE 0.12 MG/1
TABLET ORAL
Qty: 90 TABLET | Refills: 1 | Status: SHIPPED | OUTPATIENT
Start: 2019-04-19 | End: 2019-05-21 | Stop reason: SDUPTHER

## 2019-04-22 DIAGNOSIS — R26.9 NEUROLOGIC GAIT DYSFUNCTION: ICD-10-CM

## 2019-04-22 DIAGNOSIS — G20 PD (PARKINSON'S DISEASE) (HCC): ICD-10-CM

## 2019-05-21 DIAGNOSIS — G20 PD (PARKINSON'S DISEASE) (HCC): ICD-10-CM

## 2019-05-21 RX ORDER — PRAMIPEXOLE DIHYDROCHLORIDE 0.12 MG/1
0.25 TABLET ORAL 3 TIMES DAILY
Qty: 42 TABLET | Refills: 0 | Status: SHIPPED | OUTPATIENT
Start: 2019-05-21 | End: 2019-06-14 | Stop reason: SDUPTHER

## 2019-06-14 ENCOUNTER — OFFICE VISIT (OUTPATIENT)
Dept: NEUROLOGY | Facility: CLINIC | Age: 84
End: 2019-06-14
Payer: MEDICARE

## 2019-06-14 VITALS
WEIGHT: 157.8 LBS | HEART RATE: 60 BPM | SYSTOLIC BLOOD PRESSURE: 120 MMHG | HEIGHT: 61 IN | BODY MASS INDEX: 29.79 KG/M2 | DIASTOLIC BLOOD PRESSURE: 70 MMHG

## 2019-06-14 DIAGNOSIS — G20 PD (PARKINSON'S DISEASE) (HCC): ICD-10-CM

## 2019-06-14 DIAGNOSIS — R26.9 NEUROLOGIC GAIT DYSFUNCTION: ICD-10-CM

## 2019-06-14 PROCEDURE — 99213 OFFICE O/P EST LOW 20 MIN: CPT | Performed by: PSYCHIATRY & NEUROLOGY

## 2019-06-14 RX ORDER — PRAMIPEXOLE DIHYDROCHLORIDE 0.12 MG/1
TABLET ORAL
Qty: 180 TABLET | Refills: 4 | Status: SHIPPED | OUTPATIENT
Start: 2019-06-14 | End: 2019-06-14 | Stop reason: SDUPTHER

## 2019-06-14 RX ORDER — PRAMIPEXOLE DIHYDROCHLORIDE 0.12 MG/1
0.25 TABLET ORAL 3 TIMES DAILY
Qty: 180 TABLET | Refills: 6 | Status: SHIPPED | OUTPATIENT
Start: 2019-06-14 | End: 2019-10-23 | Stop reason: SDUPTHER

## 2019-10-23 ENCOUNTER — OFFICE VISIT (OUTPATIENT)
Dept: NEUROLOGY | Facility: CLINIC | Age: 84
End: 2019-10-23
Payer: MEDICARE

## 2019-10-23 VITALS
SYSTOLIC BLOOD PRESSURE: 110 MMHG | WEIGHT: 161.8 LBS | DIASTOLIC BLOOD PRESSURE: 80 MMHG | HEART RATE: 72 BPM | HEIGHT: 61 IN | BODY MASS INDEX: 30.55 KG/M2

## 2019-10-23 DIAGNOSIS — R26.9 NEUROLOGIC GAIT DYSFUNCTION: ICD-10-CM

## 2019-10-23 DIAGNOSIS — G20 PD (PARKINSON'S DISEASE) (HCC): Primary | ICD-10-CM

## 2019-10-23 PROCEDURE — 99213 OFFICE O/P EST LOW 20 MIN: CPT | Performed by: PSYCHIATRY & NEUROLOGY

## 2019-10-23 RX ORDER — PRAMIPEXOLE DIHYDROCHLORIDE 0.12 MG/1
0.25 TABLET ORAL 3 TIMES DAILY
Qty: 180 TABLET | Refills: 6 | Status: SHIPPED | OUTPATIENT
Start: 2019-10-23 | End: 2020-07-17 | Stop reason: SDUPTHER

## 2019-10-23 RX ORDER — ANASTROZOLE 1 MG/1
1 TABLET ORAL DAILY
COMMUNITY
Start: 2019-10-23 | End: 2022-01-04 | Stop reason: ALTCHOICE

## 2019-10-23 NOTE — PROGRESS NOTES
Progress Note - Neurology   Jeri Walton 80 y o  female MRN: [id number]  Unit/Bed#:  Encounter: [id number]      Subjective:   Patient is here for a follow-up visit with Parkinson's disease, characterized by resting tremor, postural instability and rigidity  Since her last visit she has been stable from the Parkinson's standpoint and denies any falls, has an intermittent tremor and remains on Sinemet Mirapex and also does a home exercises for at least 30 minutes a day  She describes drooling but denies any swallowing difficulties  Patient unfortunately was also detected to have breast cancer and underwent a left mastectomy 4 months ago and has now been maintained on Arimidex  Her most recent labs shows a hemoglobin of 9 2 somewhat lower as compared to her hemoglobin done 2 months ago  Patient denies any new neurological symptoms  ROS:   Review of Systems   Constitutional: Negative  Negative for appetite change, chills, fatigue and fever  HENT: Negative  Negative for hearing loss, tinnitus, trouble swallowing and voice change  Eyes: Negative  Negative for photophobia, pain and visual disturbance  Respiratory: Negative  Negative for shortness of breath and wheezing  Cardiovascular: Negative  Negative for chest pain and palpitations  Gastrointestinal: Negative  Negative for nausea and vomiting  Endocrine: Negative  Negative for cold intolerance and heat intolerance  Genitourinary: Negative  Negative for dysuria, frequency and urgency  Musculoskeletal: Positive for back pain  Negative for arthralgias, gait problem, myalgias, neck pain and neck stiffness  Skin: Negative  Negative for rash  Allergic/Immunologic: Negative  Neurological: Positive for tremors  Negative for dizziness, seizures, syncope, facial asymmetry, speech difficulty, weakness, light-headedness, numbness and headaches  Hematological: Negative  Does not bruise/bleed easily     Psychiatric/Behavioral: Positive for decreased concentration  Negative for confusion, hallucinations and sleep disturbance  Vitals:   Vitals:    10/23/19 1624   BP: 110/80   BP Location: Right arm   Patient Position: Sitting   Cuff Size: Adult   Pulse: 72   Weight: 73 4 kg (161 lb 12 8 oz)   Height: 5' 1" (1 549 m)   ,Body mass index is 30 57 kg/m²  MEDS:      Current Outpatient Medications:     allopurinol (ZYLOPRIM) 100 mg tablet, Take 1 tablet by mouth daily, Disp: , Rfl:     amLODIPine (NORVASC) 2 5 mg tablet, Take 1 tablet (2 5 mg total) by mouth daily Can increase to 5 mg daily if blood pressure consistently >150/90, Disp: 30 tablet, Rfl: 0    anastrozole (ARIMIDEX) 1 mg tablet, Take 1 mg by mouth daily, Disp: , Rfl:     aspirin 81 MG tablet, Take 1 tablet by mouth daily, Disp: , Rfl:     Calcium Carb-Cholecalciferol (CALCIUM + D3) 600-200 MG-UNIT TABS, Take 2 tablets by mouth 2 (two) times a day, Disp: , Rfl:     carbidopa-levodopa (SINEMET)  mg per tablet, Take 1 tablet by mouth 3 (three) times a day, Disp: 90 tablet, Rfl: 6    Cholecalciferol (VITAMIN D) 2000 units CAPS, Take 1 capsule by mouth 2 (two) times a day, Disp: , Rfl:     lisinopril (ZESTRIL) 20 mg tablet, Take 20 mg by mouth daily  , Disp: , Rfl:     Multiple Vitamins-Minerals (CENTRUM SILVER) tablet, Take 1 tablet by mouth daily, Disp: , Rfl:     pramipexole (MIRAPEX) 0 125 mg tablet, Take 2 tablets (0 25 mg total) by mouth 3 (three) times a day, Disp: 180 tablet, Rfl: 6  :    Physical Exam:  General appearance: alert, appears stated age and cooperative  Head: Normocephalic, without obvious abnormality, atraumatic    On examination patient has evidence of a resting tremor affecting her lower extremities as well as the right upper extremity, no evidence of any cogwheeling rigidity was noted, she could stand with minimal assistance, is unstable with turns, and turns in 4 steps    No focal motor weakness or sensory deficits were noted  Overall bradykinesia noted and patient ambulates without any assistive device  No bruits were appreciable in the neck  Lab Results: I have personally reviewed pertinent reports  Imaging Studies: I have personally reviewed pertinent reports  Assessment:  1  Parkinson's disease with gait dysfunction  Plan:  Patient is advised to continue present medications Sinemet 25/100, 1 tablets 3 times a day along with Mirapex 0 25 mg 3 times a day  Continue home exercise program on a regular basis, and is also encouraged to call her PCP regarding her hemoglobin  She will return back to see me in 6 months  10/23/2019,4:29 PM    Dictation voice to text software has been used in the creation of this document  Please consider this in light of any contextual or grammatical errors

## 2020-07-08 ENCOUNTER — TELEPHONE (OUTPATIENT)
Dept: NEUROLOGY | Facility: CLINIC | Age: 85
End: 2020-07-08

## 2020-07-08 NOTE — TELEPHONE ENCOUNTER
Patient's son in law, Kriss Rainey (on communication consent) was told he couldn't get an appt for patient sooner than Oct however he stated the patient's drooling has become excessive  Patient walk around with a towel because it's a constant issue and drooling wakes her up at night because she almost chokes on her saliva  He is requesting to speak to Dr Malu Jung about this, as this cannot wait until Oct      [place] on file       Kriss Rainey  [phone]

## 2020-07-10 NOTE — TELEPHONE ENCOUNTER
Pt called and states that she missed a call this morning from our office  (I don't see enc re: this call)  Chart reviewed: pt has an appt scheduled next week  7/17/20 at 1:50  Also placed pt on cancellation list  Pt and Lopez Casanova made aware  Spoke w/ Wally Espinal and states that she spoke w/ Lopez Casnaova and scheduled 7/17/20 appt         [phone] ok to leave detailed message

## 2020-07-16 ENCOUNTER — TELEPHONE (OUTPATIENT)
Dept: NEUROLOGY | Facility: CLINIC | Age: 85
End: 2020-07-16

## 2020-07-17 ENCOUNTER — OFFICE VISIT (OUTPATIENT)
Dept: NEUROLOGY | Facility: CLINIC | Age: 85
End: 2020-07-17
Payer: MEDICARE

## 2020-07-17 ENCOUNTER — TELEPHONE (OUTPATIENT)
Dept: NEUROLOGY | Facility: CLINIC | Age: 85
End: 2020-07-17

## 2020-07-17 VITALS
WEIGHT: 165.4 LBS | TEMPERATURE: 98.4 F | DIASTOLIC BLOOD PRESSURE: 90 MMHG | HEIGHT: 61 IN | BODY MASS INDEX: 31.23 KG/M2 | HEART RATE: 88 BPM | SYSTOLIC BLOOD PRESSURE: 138 MMHG

## 2020-07-17 DIAGNOSIS — R26.9 NEUROLOGIC GAIT DYSFUNCTION: ICD-10-CM

## 2020-07-17 DIAGNOSIS — G20 PD (PARKINSON'S DISEASE) (HCC): Primary | ICD-10-CM

## 2020-07-17 DIAGNOSIS — F02.80 DEMENTIA DUE TO PARKINSON'S DISEASE WITHOUT BEHAVIORAL DISTURBANCE (HCC): ICD-10-CM

## 2020-07-17 DIAGNOSIS — K11.7 SIALORRHEA: ICD-10-CM

## 2020-07-17 DIAGNOSIS — G20 DEMENTIA DUE TO PARKINSON'S DISEASE WITHOUT BEHAVIORAL DISTURBANCE (HCC): ICD-10-CM

## 2020-07-17 PROCEDURE — 99214 OFFICE O/P EST MOD 30 MIN: CPT | Performed by: PSYCHIATRY & NEUROLOGY

## 2020-07-17 RX ORDER — RIVASTIGMINE 4.6 MG/24H
1 PATCH, EXTENDED RELEASE TRANSDERMAL DAILY
Qty: 30 PATCH | Refills: 6 | Status: SHIPPED | OUTPATIENT
Start: 2020-07-17 | End: 2020-07-17 | Stop reason: SDUPTHER

## 2020-07-17 RX ORDER — PRAMIPEXOLE DIHYDROCHLORIDE 0.12 MG/1
0.25 TABLET ORAL 3 TIMES DAILY
Qty: 180 TABLET | Refills: 6 | Status: SHIPPED | OUTPATIENT
Start: 2020-07-17 | End: 2021-05-03

## 2020-07-17 RX ORDER — RIVASTIGMINE 4.6 MG/24H
1 PATCH, EXTENDED RELEASE TRANSDERMAL DAILY
Qty: 30 PATCH | Refills: 6 | Status: SHIPPED | OUTPATIENT
Start: 2020-07-17 | End: 2021-05-31

## 2020-07-17 RX ORDER — PRAMIPEXOLE DIHYDROCHLORIDE 0.12 MG/1
0.25 TABLET ORAL 3 TIMES DAILY
Qty: 180 TABLET | Refills: 6 | Status: SHIPPED | OUTPATIENT
Start: 2020-07-17 | End: 2020-07-17 | Stop reason: SDUPTHER

## 2020-07-17 NOTE — PROGRESS NOTES
Progress Note - Neurology   Mathew Montoya 80 y o  female MRN: [id number]  Unit/Bed#:  Encounter: [id number]      Subjective:   Patient was brought in on an urgent basis accompanied with her son-in-law for Parkinson's disease and worsening  She is extremely upset about this constant drooling, ambulates with the help of a walker with worsening difficulty at this time, and also recently attempted to stand from a sitting position and went down on her knees did not realize does not think if she lost consciousness and no witnessed seizure-like activity was noticed  There was no sequelae of headaches of fatigue or sleepiness after the episode  Patient's medication dosing has been erratic, she uses Sinemet in the morning anywhere between 8-10 a m  As a 1st dose and between 3-4 p m  as a 2nd dose  She remains on Sinemet and Mirapex 0 25, 3 times a day and has developed swelling in both her legs again  Patient otherwise were sharp from the cognitive standpoint now describes forgetfulness and difficulty with word finding  She also describes intermittent diplopia especially when she is reading  ROS:   Review of Systems   Constitutional: Negative  HENT: Positive for drooling  Eyes: Negative  Respiratory: Negative  Cardiovascular: Negative  Gastrointestinal: Negative  Endocrine: Negative  Genitourinary: Negative  Musculoskeletal: Negative  Skin: Negative  Allergic/Immunologic: Negative  Neurological: Positive for tremors  Hematological: Negative  Psychiatric/Behavioral: Negative  MA review of systems was reviewed by myself  Vitals:   Vitals:    07/17/20 1350 07/17/20 1400   BP: 128/88 138/90   BP Location: Left arm Right arm   Patient Position: Sitting Standing   Cuff Size: Standard Standard   Pulse: 64 88   Temp: 98 4 °F (36 9 °C)    TempSrc: Tympanic    Weight: 75 kg (165 lb 6 4 oz)    Height: 5' 1" (1 549 m)    ,Body mass index is 31 25 kg/m²      MEDS:      Current Outpatient Medications:     allopurinol (ZYLOPRIM) 100 mg tablet, Take 1 tablet by mouth daily, Disp: , Rfl:     amLODIPine (NORVASC) 2 5 mg tablet, Take 1 tablet (2 5 mg total) by mouth daily Can increase to 5 mg daily if blood pressure consistently >150/90, Disp: 30 tablet, Rfl: 0    anastrozole (ARIMIDEX) 1 mg tablet, Take 1 mg by mouth daily, Disp: , Rfl:     aspirin 81 MG tablet, Take 1 tablet by mouth daily, Disp: , Rfl:     Calcium Carb-Cholecalciferol (CALCIUM + D3) 600-200 MG-UNIT TABS, Take 2 tablets by mouth 2 (two) times a day, Disp: , Rfl:     carbidopa-levodopa (SINEMET)  mg per tablet, Take 1 tablet by mouth 3 (three) times a day, Disp: 90 tablet, Rfl: 6    Cholecalciferol (VITAMIN D) 2000 units CAPS, Take 1 capsule by mouth 2 (two) times a day, Disp: , Rfl:     Ferrous Sulfate (IRON PO), Take 1 tablet by mouth daily, Disp: , Rfl:     lisinopril (ZESTRIL) 20 mg tablet, Take 20 mg by mouth daily  , Disp: , Rfl:     Multiple Vitamins-Minerals (CENTRUM SILVER) tablet, Take 1 tablet by mouth daily, Disp: , Rfl:     pramipexole (MIRAPEX) 0 125 mg tablet, Take 2 tablets (0 25 mg total) by mouth 3 (three) times a day, Disp: 180 tablet, Rfl: 6  :    Physical Exam:  General appearance: alert, appears stated age and cooperative  Head: Normocephalic, without obvious abnormality, atraumatic    On neurological examination patient is awake and alert, could not recall my name, judgment and abstract sense was fair, her speech is  hypophonic, with copious drooling noted, and word-finding difficulty noted  bradykinesia of the facial muscles is noted, no extraocular muscle weakness was noted, and on motor and sensory exam overall bradykinesia noted with evidence of a tremor mostly affecting the left lower extremity, no cogwheeling rigidity was noted today she required assistance to stand  She ambulates with the help of a walker  Lab Results: I have personally reviewed pertinent reports    Imaging Studies: I have personally reviewed pertinent reports  Assessment:  1  Parkinson's disease with recent worsening and dementia setting in, with motor and non motor worsening as well as disturbing sialorrhea  Plan: At this time after lengthy discussion with the patient and her son-in-law patient was counseled on several aspects including regular dietary intake, advised to take her medications at fixed times every day which was noted by the son-in-law, also advised to increase Sinemet to 4 times a day, and will advise Botox injections in the near future for the sialorrhea  Some of the patient's symptoms could be related to side effects of arimidex, and will be discussed with her oncologist at their next visit  At her age would recommend discontinuing the medication  Patient also will be tried on Exelon patches since she is quite frustrated about her cognitive dysfunction setting in  She will return back to see me in 2-3 months prior to which we shall consider Botox injections for the sialorrhea  7/17/2020,2:01 PM    Dictation voice to text software has been used in the creation of this document  Please consider this in light of any contextual or grammatical errors

## 2020-07-17 NOTE — TELEPHONE ENCOUNTER
Patient's was here today with son to see Dr Antione Nelson  Unable to schedule appointment for 3 months 10/17/2020 due to Dr Antione Nelson will be out of the office  Offered November 3, 2020 patient's mom stated "what ever I will figure out later" and left  Did not take the summary just walked out of the building

## 2020-07-21 ENCOUNTER — TELEPHONE (OUTPATIENT)
Dept: NEUROLOGY | Facility: CLINIC | Age: 85
End: 2020-07-21

## 2020-07-21 NOTE — TELEPHONE ENCOUNTER
Emre Blue (son ) calling in  Spotzer Slider has questions regarding exelon patch  Called [facility] and spoke to YUMIKO ALVA  Prior Auth is required for exelon patch   optum  ID [id number]  BIN [id number]  PCN [id number]  grp [id number]  Phone# [phone]      (Key: [id number]  Submitted on CMM   Awaiting determination       Additionally wondering if there was an update on botox? I do not see further documentation regarding this beside office note   Please advise      [phone]  Emre Blue

## 2020-07-21 NOTE — TELEPHONE ENCOUNTER
Please inform son that I will be getting training on this Thursday subsequently we will schedule the patient

## 2020-07-22 NOTE — TELEPHONE ENCOUNTER
Left a message for a call back from Georgia Lim  TEAM - make patient aware that I spoke to Didier Francis and had to do a PA for rivastigmine patch which was approved through insurance   Additionally, see Dr Katie Olsen message below

## 2020-07-24 NOTE — TELEPHONE ENCOUNTER
Pt's son  called and advised pt of all of the below  He verbalized clear understanding of all instructions       He is hoping to hear back form our office re: botox appt and f/u appt             [phone] ok to leave detailed message

## 2020-07-24 NOTE — TELEPHONE ENCOUNTER
Called patient's son left message on machine informed him that I will inform call him once I get the appropriate training

## 2020-08-06 ENCOUNTER — TELEPHONE (OUTPATIENT)
Dept: NEUROLOGY | Facility: CLINIC | Age: 85
End: 2020-08-06

## 2020-08-06 DIAGNOSIS — K11.7 SIALORRHEA: Primary | ICD-10-CM

## 2020-08-06 DIAGNOSIS — G90.9 DISORDER OF THE AUTONOMIC NERVOUS SYSTEM, UNSPECIFIED: ICD-10-CM

## 2020-08-06 NOTE — TELEPHONE ENCOUNTER
Spoke with Tori, patient has [address]- no authorization required  She was going to have the girls reach out to the patient to schedule a New Start Botox appointment with Dr Cheryl Orr

## 2020-08-06 NOTE — TELEPHONE ENCOUNTER
Emmanuelle Hu MD   Neurology   Sialorrhea +1 more   Dx    Orders Placed      Chemodenervation  Medication Changes         OnabotulinumtoxinA 100 Units Intramuscular Once, For physician administration only   **SEAL THE FOLLOWING LEFTOVER/UNUSED MEDICATION IN A ZIP LOCK BAG AND SEND TO PHARMACY**      Medication List        Visit Diagnoses         Sialorrhea      Disorder of the autonomic nervous system, unspecified

## 2020-08-07 NOTE — TELEPHONE ENCOUNTER
Paul Ochoa just wanted to let you know I scheduled patient for Botox injection as per note below   For Thursday 08/13/2020 with Dr Ora Ahumada

## 2020-08-10 ENCOUNTER — DOCUMENTATION (OUTPATIENT)
Dept: NEUROLOGY | Facility: CLINIC | Age: 85
End: 2020-08-10

## 2020-08-10 NOTE — PROGRESS NOTES
Patient is scheduled with Dr Opal Hernandez on 8/13/2020 in the [facility] location

## 2020-08-10 NOTE — PROGRESS NOTES
Type  Date  User  Summary  Attachment    General  08/10/2020  7:36 AM  Marie Smith  care coordination  -    Note     Botox- no authorization needed   Please use our stock      Thank you,     Nicole Raza

## 2020-08-13 ENCOUNTER — HOSPITAL ENCOUNTER (OUTPATIENT)
Dept: ULTRASOUND IMAGING | Facility: CLINIC | Age: 85
Discharge: HOME | End: 2020-08-13
Payer: MEDICARE

## 2020-08-13 ENCOUNTER — PROCEDURE VISIT (OUTPATIENT)
Dept: NEUROLOGY | Facility: CLINIC | Age: 85
End: 2020-08-13
Payer: MEDICARE

## 2020-08-13 VITALS — DIASTOLIC BLOOD PRESSURE: 92 MMHG | SYSTOLIC BLOOD PRESSURE: 180 MMHG | TEMPERATURE: 97.1 F

## 2020-08-13 DIAGNOSIS — K11.7 SIALORRHEA: ICD-10-CM

## 2020-08-13 DIAGNOSIS — K11.7 SIALORRHEA: Primary | ICD-10-CM

## 2020-08-13 PROCEDURE — 64646 CHEMODENERV TRUNK MUSC 1-5: CPT

## 2020-08-13 PROCEDURE — 64612 DESTROY NERVE FACE MUSCLE: CPT | Performed by: PSYCHIATRY & NEUROLOGY

## 2020-08-13 PROCEDURE — 76942 ECHO GUIDE FOR BIOPSY: CPT

## 2020-08-13 NOTE — PROGRESS NOTES
Chemodenervation    Date/Time: 8/13/2020 3:04 PM  Performed by: Favio Prather MD  Authorized by: Favio Prather MD     Pre-procedure details:     Preparation: Patient was prepped and draped in usual sterile fashion      Prepped With: Alcohol    Anesthesia (see MAR for exact dosages): Anesthesia method:  None  Procedure details:     Position:  Upright  Botox:     Botox Type:  Type A    Brand:  Botox    mL's of Botulinum Toxin:  70    mL's of preservative free sterile saline:  2    Final Concentration per CC:  50 units    Needle Gauge:  30 G 2 5 inch    Medication Administration:  100 Units onabotulinumtoxin A 100 units  Procedures:     Botox Procedures: salivary gland disturbance      Indications: sialorrhea    Injection Location:     Cervical Dystonia / Salivary Gland:  L parotid gland, R parotid gland, L submandibular gland and R submandibular gland      L parotid gland injection amount:  20 unit(s)      R parotid gland injection amount:  20 unit(s)      L submandibular gland injection amount:  15 unit(s)      R submandibular gland injection amount:  15 unit(s)  Total Units:     Total units used:  70    Total units discarded:  30  Post-procedure details:     Chemodenervation:  Head or face    Facial Nerve Location[de-identified]  Bilateral facial nerve    Patient tolerance of procedure:   Tolerated well, no immediate complications

## 2020-10-22 ENCOUNTER — TELEPHONE (OUTPATIENT)
Dept: NEUROLOGY | Facility: CLINIC | Age: 85
End: 2020-10-22

## 2020-11-05 ENCOUNTER — TELEPHONE (OUTPATIENT)
Dept: NEUROLOGY | Facility: CLINIC | Age: 85
End: 2020-11-05

## 2020-11-05 ENCOUNTER — DOCUMENTATION (OUTPATIENT)
Dept: NEUROLOGY | Facility: CLINIC | Age: 85
End: 2020-11-05

## 2020-11-23 ENCOUNTER — TELEPHONE (OUTPATIENT)
Dept: NEUROLOGY | Facility: CLINIC | Age: 85
End: 2020-11-23

## 2021-01-07 ENCOUNTER — PROCEDURE VISIT (OUTPATIENT)
Dept: NEUROLOGY | Facility: CLINIC | Age: 86
End: 2021-01-07
Payer: MEDICARE

## 2021-01-07 DIAGNOSIS — K11.7 SIALORRHEA: Primary | ICD-10-CM

## 2021-01-07 PROCEDURE — 64612 DESTROY NERVE FACE MUSCLE: CPT | Performed by: PSYCHIATRY & NEUROLOGY

## 2021-02-11 DIAGNOSIS — G20 PD (PARKINSON'S DISEASE) (HCC): ICD-10-CM

## 2021-02-11 DIAGNOSIS — R26.9 NEUROLOGIC GAIT DYSFUNCTION: ICD-10-CM

## 2021-02-12 DIAGNOSIS — Z23 ENCOUNTER FOR IMMUNIZATION: ICD-10-CM

## 2021-02-12 NOTE — TELEPHONE ENCOUNTER
Received a call from Monty Loza with [facility] with [facility]  She requested sinemet refill  Informed her that we sent the rx to [address] [place] [address]  She stated she will contact the patient's son to confirm they don't want [facility] to fill

## 2021-02-23 DIAGNOSIS — G20 PD (PARKINSON'S DISEASE) (HCC): ICD-10-CM

## 2021-02-23 DIAGNOSIS — R26.9 NEUROLOGIC GAIT DYSFUNCTION: ICD-10-CM

## 2021-03-05 ENCOUNTER — DOCUMENTATION (OUTPATIENT)
Dept: NEUROLOGY | Facility: CLINIC | Age: 86
End: 2021-03-05

## 2021-03-05 NOTE — PROGRESS NOTES
Type Date User Summary Attachment   General 03/05/2021  1:19 PM Tayo Jorgensen care coordination  -   Note    Botox- no authorization needed   Please use our stock      Thank you,     Anny Cramer

## 2021-04-01 ENCOUNTER — TELEPHONE (OUTPATIENT)
Dept: NEUROLOGY | Facility: CLINIC | Age: 86
End: 2021-04-01

## 2021-04-01 NOTE — TELEPHONE ENCOUNTER
Oneida Orr is fine- patient has medicare and we will be using stock   Please let me know once patient is scheduled so I can attach a referral     Thank you    Jarrod Taylor

## 2021-04-01 NOTE — TELEPHONE ENCOUNTER
Dr Adrian Stanley will like to bring in patient next month for Botox Injection       Please Advised!!!!

## 2021-04-12 ENCOUNTER — PROCEDURE VISIT (OUTPATIENT)
Dept: NEUROLOGY | Facility: CLINIC | Age: 86
End: 2021-04-12
Payer: MEDICARE

## 2021-04-12 VITALS — SYSTOLIC BLOOD PRESSURE: 132 MMHG | TEMPERATURE: 97.4 F | DIASTOLIC BLOOD PRESSURE: 82 MMHG

## 2021-04-12 DIAGNOSIS — K11.7 SIALORRHEA: Primary | ICD-10-CM

## 2021-04-12 PROCEDURE — 64612 DESTROY NERVE FACE MUSCLE: CPT | Performed by: PSYCHIATRY & NEUROLOGY

## 2021-04-12 NOTE — PROGRESS NOTES
Universal Protocol   Consent: Written consent obtained  Risks and benefits: risks, benefits and alternatives were discussed  Consent given by: patient  Patient understanding: patient states understanding of the procedure being performed  Patient identity confirmed: verbally with patient        Chemodenervation     Date/Time 4/12/2021 1:47 PM     Performed by  Sohan Wagner MD     Authorized by Sohan Wagner MD        Pre-procedure details      Prepped With: Alcohol     Anesthesia  (see MAR for exact dosages): Anesthesia method:  None   Procedure details     Position:  Upright   Botox     Botox Type:  Type A    Brand:  Botox    mL's of Botulinum Toxin:  90    Final Concentration per CC:  50 units    Needle Gauge:  30 G 2 5 inch    Medication Administration:  100 Units onabotulinumtoxin A 100 units   Procedures     Botox Procedures: salivary gland disturbance      Indications: sialorrhea     Injection Location      Cervical Dystonia / Salivary Gland:  R parotid gland, L parotid gland, R submandibular gland and L submandibular gland      L parotid gland injection amount:  30 unit(s)      R parotid gland injection amount:  30 unit(s)      L submandibular gland injection amount:  15 unit(s)      R submandibular gland injection amount:  15 unit(s)   Total Units     Total units used:  90    Total units discarded:  10   Post-procedure details      Chemodenervation:  Head or face    Facial Nerve Location[de-identified]  Bilateral facial nerve    Patient tolerance of procedure:   Tolerated well, no immediate complications

## 2021-05-03 DIAGNOSIS — G20 PD (PARKINSON'S DISEASE) (HCC): ICD-10-CM

## 2021-05-03 RX ORDER — PRAMIPEXOLE DIHYDROCHLORIDE 0.12 MG/1
TABLET ORAL
Qty: 180 TABLET | Refills: 5 | Status: SHIPPED | OUTPATIENT
Start: 2021-05-03 | End: 2022-01-04

## 2021-05-19 ENCOUNTER — DOCUMENTATION (OUTPATIENT)
Dept: NEUROLOGY | Facility: CLINIC | Age: 86
End: 2021-05-19

## 2021-05-19 NOTE — PROGRESS NOTES
Patient is scheduled with Dr Armin Mclaughlin on 7/13/2021 in the [facility]

## 2021-05-19 NOTE — PROGRESS NOTES
Type Date User Summary Attachment   General 05/18/2021  1:52 PM Charla Encarnacion care coordination  -   Note    Botox- no authorization needed   Please use our stock      Thank you,     Lexii Monge

## 2021-05-29 DIAGNOSIS — G20 DEMENTIA DUE TO PARKINSON'S DISEASE WITHOUT BEHAVIORAL DISTURBANCE (HCC): ICD-10-CM

## 2021-05-29 DIAGNOSIS — F02.80 DEMENTIA DUE TO PARKINSON'S DISEASE WITHOUT BEHAVIORAL DISTURBANCE (HCC): ICD-10-CM

## 2021-05-31 RX ORDER — RIVASTIGMINE 4.6 MG/24H
PATCH, EXTENDED RELEASE TRANSDERMAL
Qty: 30 PATCH | Refills: 5 | Status: SHIPPED | OUTPATIENT
Start: 2021-05-31 | End: 2022-01-04

## 2021-06-07 ENCOUNTER — TELEPHONE (OUTPATIENT)
Dept: NEUROLOGY | Facility: CLINIC | Age: 86
End: 2021-06-07

## 2021-06-07 NOTE — TELEPHONE ENCOUNTER
Patient feeling dizzy and has had a few falls, advised him to decrease the Sinemet to 3 times a day insert of 4 times a day and see how she does otherwise they will speak to Dr Cheryl Orr

## 2021-06-07 NOTE — TELEPHONE ENCOUNTER
Pt's son Vonda Mattson) called in to give update on pt  She has fallen almost daily in the last 10 days  She is no longer doing her letitia activies- sewing, watching TV, doing laundry  She is increasing more confused  Star Montero- has to also feed pt her medication as she is unable to take them herself  Pt asking if this is just what's expected in her disease process or this there is any medication adjustments that could be done      [phone]

## 2021-06-08 NOTE — TELEPHONE ENCOUNTER
FYI:  Patient's son calling back to inform us that they are taking the patient to [facility] in [place] because the patient's sxs continue to worsen

## 2021-06-08 NOTE — TELEPHONE ENCOUNTER
Corrinne Ina made aware and will take pt to be seen by pcp today  Dr Sandra Stark please call Corrinne Ina tomorrow

## 2021-06-08 NOTE — TELEPHONE ENCOUNTER
Elena Lowe called back in stating that pt fell 4 times last night which is not usual for her  He is questioning if he should bring her to her pcp to rule out infection  Made him aware that this would be recommended but he wanted me to check with Dr Lance harris for his thoughts      [phone]

## 2021-06-29 ENCOUNTER — TELEPHONE (OUTPATIENT)
Dept: NEUROLOGY | Facility: CLINIC | Age: 86
End: 2021-06-29

## 2021-07-13 ENCOUNTER — TELEPHONE (OUTPATIENT)
Dept: NEUROLOGY | Facility: CLINIC | Age: 86
End: 2021-07-13

## 2021-07-13 NOTE — LETTER
July 13, 2021     Светлана Sutton     Dear Ms Jazmín Altman,    Appointment Missed: 7/13/2021      Appointment Scheduled with: Gurinder Martel MD    We understand that many situations arise that occasionally prevents patients from keeping scheduled appointments  It is the policy of [address] [place] [address] [facility] that patients notify us 24 hours in advance if unable to keep a scheduled appointment  Missed appointments jeopardize strong physician-patient relationships  The appointment you missed could have easily been made available to another patient if you had contacted us to cancel  We like to accommodate all of our patients, but when patients miss an appointment it prevents us from being able to help everyone  In the future, we request at least a 24 hour notice of cancellation so we can make your appointment available to someone else in need         Sincerely,    The Physicians and Staff of [address] [place] [address] [facility]rology [facility]

## 2021-08-06 ENCOUNTER — TELEPHONE (OUTPATIENT)
Dept: NEUROLOGY | Facility: CLINIC | Age: 86
End: 2021-08-06

## 2021-08-06 NOTE — TELEPHONE ENCOUNTER
Cheikh Echevarria called in stating that pt will be moving to [facility] assisted living  He wanted to make Dr Dante Rose aware      [phone] Rupa Mobley

## 2021-08-07 ENCOUNTER — PATIENT MESSAGE (OUTPATIENT)
Dept: NEUROLOGY | Facility: CLINIC | Age: 86
End: 2021-08-07

## 2021-08-07 DIAGNOSIS — F02.80 DEMENTIA DUE TO PARKINSON'S DISEASE WITHOUT BEHAVIORAL DISTURBANCE (HCC): Primary | ICD-10-CM

## 2021-08-07 DIAGNOSIS — G20 DEMENTIA DUE TO PARKINSON'S DISEASE WITHOUT BEHAVIORAL DISTURBANCE (HCC): Primary | ICD-10-CM

## 2021-08-07 DIAGNOSIS — G20 PD (PARKINSON'S DISEASE) (HCC): ICD-10-CM

## 2021-08-10 RX ORDER — RIVASTIGMINE 4.6 MG/24H
1 PATCH, EXTENDED RELEASE TRANSDERMAL DAILY
Qty: 30 PATCH | Refills: 5 | Status: SHIPPED | OUTPATIENT
Start: 2021-08-10 | End: 2022-01-04 | Stop reason: SDUPTHER

## 2021-08-10 RX ORDER — PRAMIPEXOLE DIHYDROCHLORIDE 0.12 MG/1
0.25 TABLET ORAL 3 TIMES DAILY
Qty: 180 TABLET | Refills: 5 | Status: SHIPPED | OUTPATIENT
Start: 2021-08-10 | End: 2021-12-30 | Stop reason: SDUPTHER

## 2021-09-21 ENCOUNTER — TELEPHONE (OUTPATIENT)
Dept: NEUROLOGY | Facility: CLINIC | Age: 86
End: 2021-09-21

## 2021-09-21 NOTE — TELEPHONE ENCOUNTER
Please check with them if you would like to speak to me or wait for Dr Zev Curran is not in the office and will be back next week

## 2021-09-27 ENCOUNTER — TELEPHONE (OUTPATIENT)
Dept: NEUROLOGY | Facility: CLINIC | Age: 86
End: 2021-09-27

## 2021-09-27 NOTE — TELEPHONE ENCOUNTER
FYI  Patient's son in law called to report she had a shoulder replacement 3 days ago and will be in [facility] rehab in [place]   Once she gets through that they will call to schedule an appointment

## 2021-12-30 ENCOUNTER — TELEPHONE (OUTPATIENT)
Dept: NEUROLOGY | Facility: CLINIC | Age: 86
End: 2021-12-30

## 2021-12-30 DIAGNOSIS — G20 PD (PARKINSON'S DISEASE) (HCC): ICD-10-CM

## 2021-12-30 RX ORDER — PRAMIPEXOLE DIHYDROCHLORIDE 0.12 MG/1
0.25 TABLET ORAL 3 TIMES DAILY
Qty: 180 TABLET | Refills: 5 | Status: SHIPPED | OUTPATIENT
Start: 2021-12-30 | End: 2022-01-04 | Stop reason: SDUPTHER

## 2022-01-04 ENCOUNTER — TELEMEDICINE (OUTPATIENT)
Dept: NEUROLOGY | Facility: CLINIC | Age: 87
End: 2022-01-04
Payer: MEDICARE

## 2022-01-04 DIAGNOSIS — F02.80 DEMENTIA DUE TO PARKINSON'S DISEASE WITHOUT BEHAVIORAL DISTURBANCE (HCC): ICD-10-CM

## 2022-01-04 DIAGNOSIS — G20 PD (PARKINSON'S DISEASE) (HCC): ICD-10-CM

## 2022-01-04 DIAGNOSIS — G20 DEMENTIA DUE TO PARKINSON'S DISEASE WITHOUT BEHAVIORAL DISTURBANCE (HCC): ICD-10-CM

## 2022-01-04 PROCEDURE — 99214 OFFICE O/P EST MOD 30 MIN: CPT

## 2022-01-04 RX ORDER — PRAMIPEXOLE DIHYDROCHLORIDE 0.12 MG/1
0.25 TABLET ORAL 3 TIMES DAILY
Qty: 180 TABLET | Refills: 5 | Status: SHIPPED | OUTPATIENT
Start: 2022-01-04

## 2022-01-04 RX ORDER — RIVASTIGMINE 4.6 MG/24H
1 PATCH, EXTENDED RELEASE TRANSDERMAL DAILY
Qty: 30 PATCH | Refills: 5 | Status: SHIPPED | OUTPATIENT
Start: 2022-01-04

## 2022-01-04 NOTE — PATIENT INSTRUCTIONS
Continue:  carbidopa levodopa  1 tablet 4 times daily  pramipexole 0 125 mg tablets 2 tablets 3 times a day  Physical therapy  Continue mind stimulating activities such as reading, coloring, puzzles, crossword puzzles, other social interaction etc     Consider:  Returning for Botox treatments for excessive salivation  Other recommendations:  Reinitiate rivastigmine patches at 4 6 mg however monitor the patient's heart rate to ensure no bradycardic episodes  Should she consistently have a heart rate under 60 after reinitiating rivastigmine, please let us know so that rivastigmine patches can be switched to capsules which are available at a lower dose      Follow-up with urology for renal mass  Proceed with nutritional consult  Follow up with family physician for weight loss

## 2022-01-04 NOTE — ASSESSMENT & PLAN NOTE
Rashard Prather is a 80-year-old female with Parkinson's disease related dementia  Her memory has declined with the discontinuation of rivastigmine patches due to concern of bradycardia  I discussed with Jocelyne Dawson today that we can reinitiate rivastigmine patches at 4 6 mg however will also ask [facility] to monitor the patient's heart rate to ensure no bradycardic episodes  Should she consistently have a heart rate under 60 after reinitiating rivastigmine they should let us know and we will have to discontinue rivastigmine patches and switch to capsules which are available at a lower dose  I have encouraged continued mind stimulating activities

## 2022-01-04 NOTE — ASSESSMENT & PLAN NOTE
Jaylin Delgado is a 59-year-old female with Parkinson's disease maintained on carbidopa levodopa  1 tablet 4 times daily and pramipexole 0 125 mg tablets 2 tablets 3 times a day  Due to several recent admissions there was confusion over her dosing of pramipexole and was only receiving 1 tablet 3 times a day  Will make the recommendation today to re-initiate 2 tablets 3 times a day  She should continue on current carbidopa levodopa dosing  I recommend continued physical therapy and home exercise program after discharge  She does continue to have excessive elevation  I recommended she return for Botox treatment  Corrinne Ina will consider doing this after she has had her consultation with Urology to evaluate for renal carcinoma first   In the interim it is planned that she will have a nutritional consultation due to her weight loss      She will follow-up in 4 months

## 2022-01-04 NOTE — PROGRESS NOTES
Virtual Regular Visit    Verification of patient location:    Patient is located in the following state in which I hold an active license [place]      Assessment/Plan:    Problem List Items Addressed This Visit        Nervous and Auditory    PD (Parkinson's disease) ([facility] )     Ingrid Lala is a 49-year-old female with Parkinson's disease maintained on carbidopa levodopa  1 tablet 4 times daily and pramipexole 0 125 mg tablets 2 tablets 3 times a day  Due to several recent admissions there was confusion over her dosing of pramipexole and was only receiving 1 tablet 3 times a day  Will make the recommendation today to re-initiate 2 tablets 3 times a day  She should continue on current carbidopa levodopa dosing  I recommend continued physical therapy and home exercise program after discharge  She does continue to have excessive elevation  I recommended she return for Botox treatment  Hakeem Akash will consider doing this after she has had her consultation with Urology to evaluate for renal carcinoma first   In the interim it is planned that she will have a nutritional consultation due to her weight loss  She will follow-up in 4 months         Relevant Medications    carbidopa-levodopa (SINEMET)  mg per tablet    pramipexole (MIRAPEX) 0 125 mg tablet    Dementia due to Parkinson's disease without behavioral disturbance ([facility] )     Ingrid Lala is a 49-year-old female with Parkinson's disease related dementia  Her memory has declined with the discontinuation of rivastigmine patches due to concern of bradycardia  I discussed with Irwin Webb today that we can reinitiate rivastigmine patches at 4 6 mg however will also ask [facility] to monitor the patient's heart rate to ensure no bradycardic episodes    Should she consistently have a heart rate under 60 after reinitiating rivastigmine they should let us know and we will have to discontinue rivastigmine patches and switch to capsules which are available at a lower dose   I have encouraged continued mind stimulating activities  Relevant Medications    carbidopa-levodopa (SINEMET)  mg per tablet    pramipexole (MIRAPEX) 0 125 mg tablet    rivastigmine (EXELON) 4 6 mg/24 hr TD 24 hr patch               Reason for visit is   Chief Complaint   Patient presents with    Virtual Regular Visit        Encounter provider Diane Drake, [facility]    Provider located at [address] [id number]      Recent Visits  No visits were found meeting these conditions  Showing recent visits within past 7 days and meeting all other requirements  Today's Visits  Date Type Provider Dept   01/04/22 Telemedicine [address] , [address] [facility] today's visits and meeting all other requirements  Future Appointments  No visits were found meeting these conditions  Showing future appointments within next 150 days and meeting all other requirements       The patient was identified by name and date of birth  Tao Valverde was informed that this is a telemedicine visit and that the visit is being conducted through [address] and patient was informed this is a secure, HIPAA-complaint platform  She agrees to proceed     My office door was closed  No one else was in the room  She acknowledged consent and understanding of privacy and security of the video platform  The patient has agreed to participate and understands they can discontinue the visit at any time  Patient is aware this is a billable service  Subjective  Tao Valverde is a 80 y o  female known to the practice for Parkinson's disease and dementia  She was last seen by Dr José Miguel Adams 07/17/2020  Since that time she has had multiple admissions for UTI as well as a shoulder dislocation s/p fall ultimately requiring partial shoulder replacement    After each of these admissions she had subsequent stays at rehabilitation facilities before returning to her assisted living where she moved to in September of this year  Unfortunately she is no longer ambulatory  Although she does work with physical therapy in walks with a walker for a maximum of about 200 ft  Otherwise she is wheelchair bound  She has had several falls attempting to get out of bed or the chair by herself  Most recent fall was on 12/25/21  She had extensive imaging to rule out dislocation, fracture, intracranial hemorrhage etc  incidentally she was found to have a mass off of the lower pole of her right kidney thought to most likely represent a renal carcinoma  She will be following up with Urology for this  She has also lost approximately 50 lbs over the last 7 months  Elham states they will be getting a nutritional consultation to further investigate her eating habits  Her son-in-law yulissa is with her today and states due to the multiple admissions to the hospital and then to rehab, at some point there was confusion regarding her Parkinson's medication dosing  She continued to receive carbidopa levodopa  1 tablet 4 times a day however was not receiving pramipexole 0 125 mg 2 tablets 3 times a day, instead was receiving 1 tablet 3 times a day  [facility] physical therapy did not feel that she was progressing during their sessions with her in regard to her ambulation  However now with knowing she was not receiving the correct dosing of medication they are willing to continue to work with her  Carlitos Prescott reports freezing with walking and is hopeful with the change in dosing that this will improve  Today she tells me that she does feel that her handwriting has improved although it is still "scratchy"  She denies any tremor or rigidity  She does continue to have excessive salivation  She previously had two botox treatments    She denies any difficulty swallowing    In regard to her memory, Elham does feel that her memory has declined  Although at some point her rivastigmine patch was discontinued due to concerns over bradycardia  He states that her memory waxes and wanes and there are some days that she is better than others  She is still able to recognize her left once and can still read and write  She rarely experiences hallucinations, if she does they are not frightening  She does require assistance with all ADLs, and the assisted living manages all of her medications  Memory exam is listed below        Past Medical History:   Diagnosis Date    Arthritis     BPV (benign positional vertigo)     Gout     High cholesterol     History of breast cancer     Hypertension     Lumbar spinal stenosis     Parkinsons disease ([address] )     Strain of thoracic region     Tremor        Past Surgical History:   Procedure Laterality Date    APPENDECTOMY      CATARACT EXTRACTION      MASTECTOMY Left 07/24/2019    REPLACEMENT TOTAL KNEE BILATERAL      TONSILLECTOMY      US GUIDANCE  8/13/2020       Current Outpatient Medications   Medication Sig Dispense Refill    allopurinol (ZYLOPRIM) 100 mg tablet Take 1 tablet by mouth daily      aspirin 81 MG tablet Take 1 tablet by mouth daily      Calcium Carb-Cholecalciferol (CALCIUM + D3) 600-200 MG-UNIT TABS Take 2 tablets by mouth 2 (two) times a day      carbidopa-levodopa (SINEMET)  mg per tablet Take 1 tablet by mouth 4 (four) times a day 180 tablet 5    Cholecalciferol (VITAMIN D) 2000 units CAPS Take 1 capsule by mouth 2 (two) times a day      lisinopril (ZESTRIL) 20 mg tablet Take 20 mg by mouth daily        Multiple Vitamins-Minerals (CENTRUM SILVER) tablet Take 1 tablet by mouth daily      pramipexole (MIRAPEX) 0 125 mg tablet Take 2 tablets (0 25 mg total) by mouth 3 (three) times a day 180 tablet 5    rivastigmine (EXELON) 4 6 mg/24 hr TD 24 hr patch Place 1 patch on the skin daily 30 patch 5    Ferrous Sulfate (IRON PO) Take 1 tablet by mouth daily       Current Facility-Administered Medications   Medication Dose Route Frequency Provider Last Rate Last Admin    onabotulinumtoxin A (BOTOX) injection 100 Units  100 Units Intramuscular Once Ingrid Izaguirre MD            Allergies   Allergen Reactions    Cephalosporins Rash    Penicillin V Rash       Review of Systems   Constitutional: Negative  Negative for appetite change and fever  HENT: Negative  Negative for hearing loss, tinnitus, trouble swallowing and voice change  Eyes: Negative  Negative for photophobia and pain  Respiratory: Negative  Negative for shortness of breath  Cardiovascular: Negative  Negative for palpitations  Gastrointestinal: Negative  Negative for nausea and vomiting  Endocrine: Negative  Negative for cold intolerance  Genitourinary: Negative  Negative for dysuria, frequency and urgency  Musculoskeletal: Positive for gait problem  Negative for myalgias and neck pain  Skin: Negative  Negative for rash  Neurological: Positive for weakness  Negative for dizziness, tremors, seizures, syncope, facial asymmetry, speech difficulty, light-headedness, numbness and headaches  Patient states weakness in legs   Hematological: Bruises/bleeds easily  Psychiatric/Behavioral: Positive for confusion  Negative for hallucinations and sleep disturbance  Video Exam    There were no vitals filed for this visit  Physical Exam     Shanice Calloway is able to correctly state her name, date of birth, and current location  She incorrectly states today's date as 12/3, incorrectly states the month is December, incorrectly states the day of the week as Wednesday  She is still able to read and tells me she enjoys reading  She is able to repeat, able to name an item "pen", able to name its purpose "to write letters", she is able to recall what she had for dinner- peanut butter and jelly sandwich    She can name 8/10 fruit without repeating:  Apple  Orange  Pear  Peaches  Prunes  Watermelon  Cantaloupe  Pineapple    Physical Exam  Constitutional:       Appearance: Normal appearance  Appears her stated age  HENT:      Head: Normocephalic and atraumatic  Eyes:      Extraocular Movements: Extraocular movements intact  Pulmonary:      Effort: Pulmonary effort is normal     Psychiatric:         Mood and Affect: Mood normal   At times appears withdrawn     Neurological Examination:      Mental Status: The patient was awake, alert, attentive, oriented to person, and place  Recent and remote memory intact to conversation with no evidence of language dysfunction  There is hypophonia  Cranial Nerves:   III,IV,VI: extraocular muscles EOMI, no nystagmus   V: Sensation in the V1 through V3 distributions intact to light touch bilaterally  VII: Face is symmetric with no weakness noted  VIII: Audition intact to conversation  XII: Tongue midline with no atrophy or fasciculations with appropriate movement  Motor Examination:   She is able to raise both arms in front of her and hold antigravity  She can lift her left arm above her head however has limited range of motion of the right shoulder due to pain  Sensory: Normal symmetric sensation to light touch in both upper and lower extremities  Gait: She is nonambulatory and presents for exam virtually sitting in her wheelchair           I spent 45 minutes directly with the patient during this visit    Marquita Mckenzie verbally agrees to participate in [employer]  Pt is aware that [employer] could be limited without vital signs or the ability to perform a full hands-on physical Andrearan Villegas understands she or the provider may request at any time to terminate the video visit and request the patient to seek care or treatment in person

## 2022-01-05 ENCOUNTER — TELEPHONE (OUTPATIENT)
Dept: NEUROLOGY | Facility: CLINIC | Age: 87
End: 2022-01-05

## 2022-01-05 NOTE — TELEPHONE ENCOUNTER
Called [facility],which is were Lizett Stark is currently living  Called them to let them know I am faxing over patients   Office visit notes and after summary per [facility]

## 2022-01-27 ENCOUNTER — TELEPHONE (OUTPATIENT)
Dept: NEUROLOGY | Facility: CLINIC | Age: 87
End: 2022-01-27

## 2022-01-27 NOTE — TELEPHONE ENCOUNTER
Pt's son in law yulissa left message asking to speak to [facility] or dr Rosa Dunn regarding her current condition  [phone]    Called yulissa to get more info  States that on   1/16 facility sent her to the ER at night becuse she rolls out of bed which is only a foot off the floor  And they have to assume that she hit her head  they have been sending her to [facility], they found a UTI, gave her antibiotics  in the meantime the facilty advised him that they have an incontinence program and for an extra fee they will check her every 2 hours throughout the night   he has opted to do this   pt is in assited living at [facility]    1/23-found pt on floor and assuming that she hit her head and sent her to the ER, ER then called him to give him an update, pt was diverted to [facility], she was previously at [facility]  they put her on a ventalator,  they said that she came in as a trauma and they just treat then once stabilized they find out who pt is  She was in ICU, vented and sedated, pt off vent 2 days ago  still in ICU but will be moving pt to a regular room and will be assessed by therapy  Yesterday he talked to the facility and they told him that Pt either needs to go to rehab or hospice prior to coming back to assisted living  I made him aware that the hospital would be the best to determine next steps in care    And advised that he contact facility to get clarification on rehab vs hospice

## 2022-01-27 NOTE — TELEPHONE ENCOUNTER
Kingsley Lynn  to [address]          1/27/22 1:55 PM  I left a message to have a call with you for an update on Jace Khan and a couple questions     Marily Basilio  son in [facility]  [phone] (cell)               1/27/22 2:16 PM  Tahira Simmons routed this conversation to [facility]

## 2022-01-31 NOTE — TELEPHONE ENCOUNTER
I called and spoke to Edmundo Macias  Quoc Buenrostro is now out of the ICU and back to her previous baseline  Edmundo Macias states the hospital was contemplating discharge today back to her assisted living however he states the facility sent him an email stating before she returns to assisted living she should attend rehab or hospice  He is not sure why they are recommending hospice, he will be calling later today for clarification on this  I did explain that acute rehab after a hospitalization such as this one may not be a bad idea so that PT/OT can work with her and re-condition her before sending her back to her assisted living  He agrees, and states the hospital physical therapists will be meeting with him today to discuss if she meets the criteria for this or not  All questions answered, nothing needed from our end  He will continue to work with the facility and hospital on her discharge  He will let us know if she needs anything from a neuro perspective